# Patient Record
Sex: FEMALE | Race: WHITE | NOT HISPANIC OR LATINO | Employment: FULL TIME | URBAN - METROPOLITAN AREA
[De-identification: names, ages, dates, MRNs, and addresses within clinical notes are randomized per-mention and may not be internally consistent; named-entity substitution may affect disease eponyms.]

---

## 2018-05-28 ENCOUNTER — HOSPITAL ENCOUNTER (EMERGENCY)
Facility: HOSPITAL | Age: 33
Discharge: HOME | End: 2018-05-28
Attending: EMERGENCY MEDICINE | Admitting: EMERGENCY MEDICINE

## 2018-05-28 VITALS
TEMPERATURE: 98.1 F | DIASTOLIC BLOOD PRESSURE: 94 MMHG | BODY MASS INDEX: 36.1 KG/M2 | WEIGHT: 230 LBS | SYSTOLIC BLOOD PRESSURE: 164 MMHG | HEART RATE: 87 BPM | HEIGHT: 67 IN | RESPIRATION RATE: 18 BRPM

## 2018-05-28 DIAGNOSIS — L51.9 ERYTHEMA MULTIFORME: Primary | ICD-10-CM

## 2018-05-28 PROCEDURE — 99282 EMERGENCY DEPT VISIT SF MDM: CPT

## 2018-05-28 RX ORDER — HYDROXYZINE HYDROCHLORIDE 25 MG/1
25 TABLET, FILM COATED ORAL EVERY 6 HOURS
Qty: 30 TABLET | Refills: 0 | Status: SHIPPED | OUTPATIENT
Start: 2018-05-28

## 2018-05-28 RX ORDER — ACYCLOVIR 200 MG/1
400 CAPSULE ORAL ONCE
Status: COMPLETED | OUTPATIENT
Start: 2018-05-28 | End: 2018-05-28

## 2018-05-28 RX ORDER — ACYCLOVIR 400 MG/1
400 TABLET ORAL 4 TIMES DAILY
Qty: 28 TABLET | Refills: 0 | Status: SHIPPED | OUTPATIENT
Start: 2018-05-28 | End: 2020-02-14

## 2018-05-28 RX ORDER — HYDROXYZINE HYDROCHLORIDE 25 MG/1
25 TABLET, FILM COATED ORAL ONCE
Status: COMPLETED | OUTPATIENT
Start: 2018-05-28 | End: 2018-05-28

## 2018-05-28 RX ORDER — PREDNISONE 20 MG/1
60 TABLET ORAL ONCE
Status: COMPLETED | OUTPATIENT
Start: 2018-05-28 | End: 2018-05-28

## 2018-05-28 RX ORDER — PREDNISONE 10 MG/1
60 TABLET ORAL DAILY
Qty: 30 TABLET | Refills: 0 | Status: SHIPPED | OUTPATIENT
Start: 2018-05-28 | End: 2018-06-02

## 2018-05-28 RX ORDER — MUPIROCIN CALCIUM 20 MG/G
CREAM TOPICAL 3 TIMES DAILY
Qty: 15 G | Refills: 0 | Status: SHIPPED | OUTPATIENT
Start: 2018-05-28

## 2018-05-28 RX ADMIN — ACYCLOVIR 400 MG: 200 CAPSULE ORAL at 13:41

## 2018-05-28 RX ADMIN — HYDROXYZINE HYDROCHLORIDE 25 MG: 25 TABLET ORAL at 12:16

## 2018-05-28 RX ADMIN — PREDNISONE 60 MG: 20 TABLET ORAL at 12:17

## 2018-05-28 NOTE — ED PROVIDER NOTES
Final Diagnosis:  1  Erythema multiforme      Chief Complaint   Patient presents with    Rash     pt c/o rash that started on her right leg that now spread to her body  This is a 41-year-old female who presents for evaluation of a rash  The patient states that she remembers she was cleaning her yard and thinks she was maybe exposed to some poison ivy  She noticed that on the right posterior lateral leg, she developed a large lesion  It was very itchy  Since then she started knows these target urticarial type lesions occurring all over her body starting in the legs and heading proximally  It is very itchy  She tried 1 dose of Benadryl yesterday with no improvement  Calamine lotion was not helping  Because of the continued rash she has come in for evaluation  No fevers chills nausea vomiting chest pain shortness of breath  No tongue swelling throat swelling  No itchiness in her throat  No mucosal lesions  Denies dizziness or lightheadedness  She does have some increased rosacea since the onset of rash  Denies new medications  Denies new detergents  No history of similar  PMH:  - none  PSH:  - none  No smoking, drinking, drugs  PE:   Vitals:    05/28/18 1159   BP: 164/94   BP Location: Left arm   Pulse: 87   Resp: 18   Temp: 98 1 °F (36 7 °C)   TempSrc: Tympanic   Weight: 104 kg (230 lb)   Height: 5' 7" (1 702 m)   General: VSS, NAD, awake, alert  Well-nourished, well-developed  Appears stated age  Speaking normally in full sentences  Head: Normocephalic, atraumatic, nontender  Eyes: PERRL, EOM-I  No diplopia  No hyphema  No subconjunctival hemorrhages  Symmetrical lids  ENT: Atraumatic external nose and ears  MMM  No oral lesions  Has angle of mouth bilateral irritated, but looks like from dry skin  No malocclusion  No stridor  Normal phonation  No drooling  Normal swallowing  Neck: Symmetric, trachea midline  No JVD    CV: RRR  +S1/S2  No murmurs or gallops  Peripheral pulses +2 throughout  No chest wall tenderness  Lungs:   Unlabored No retractions  CTAB, lungs sounds equal bilateral    No tachypnea  Abd: +BS, soft, NT/ND    MSK:   FROM   Back:   No rashes  Skin: rash consistent with erythema multiforme present  Secondary excoriation noted but nothing suggesting bacterial cellulitis at the moment except right posterior leg  That sort of looks like impetigo so will trial treatment geared towards that  Neuro: AAOx3, GCS 15, CN II-XII grossly intact  Motor grossly intact  Psychiatric/Behavioral: Appropriate mood and affect   Exam: deferred  A:  - erythema multiforme  - secondary impetigo  P:  - mupirocin for the possible impetigo  - steroids + anti-histamine for the EM  - f/u PCP (patient states she won't)  - so come back here in 72 hours to make sure no massive progression to SJS/TEN  Does NOT HAVE any mucosal involvement at this time  - 13 point ROS was performed and all are normal unless stated in the history above  - Nursing note reviewed  Vitals reviewed  - Orders placed by myself and/or advanced practitioner / resident     - Previous chart was not reviewed  - No language barrier    - History obtained from patient  - There are no limitations to the history obtained  - Critical care time: Not applicable for this patient  ED Course as of May 28 1322   Mon May 28, 2018   1320 The patient has urticarial lesions looks slightly improved  I re-evaluated the rash on the right lateral leg  It does look like there might be a small herpetic component to it so I will add acyclovir  Patient understands the importance of follow-up  Medications   acyclovir (ZOVIRAX) capsule 400 mg (not administered)   hydrOXYzine HCL (ATARAX) tablet 25 mg (25 mg Oral Given 5/28/18 1216)   predniSONE tablet 60 mg (60 mg Oral Given 5/28/18 1217)     No orders to display     No orders of the defined types were placed in this encounter      Labs Reviewed - No data to display  Time reflects when diagnosis was documented in both MDM as applicable and the Disposition within this note     Time User Action Codes Description Comment    5/28/2018 12:13 PM Chevy Mendez Add [L51 9] Erythema multiforme       ED Disposition     ED Disposition Condition Comment    Discharge  Patel Galvan discharge to home/self care  Condition at discharge: Good        Follow-up Information     Follow up With Specialties Details Why Contact Info Additional [address] Emergency Department Emergency Medicine Go to If symptoms worsen [address]  [phone]  ED, [address] [facility], [facility] [place], [address] [facility], [address]        Patient's Medications   Discharge Prescriptions    ACYCLOVIR (ZOVIRAX) 400 MG TABLET    Take 1 tablet (400 mg total) by mouth 4 (four) times a day for 7 days       Start Date: 5/28/2018 End Date: 6/4/2018       Order Dose: 400 mg       Quantity: 28 tablet    Refills: 0    HYDROXYZINE HCL (ATARAX) 25 MG TABLET    Take 1 tablet (25 mg total) by mouth every 6 (six) hours       Start Date: 5/28/2018 End Date: --       Order Dose: 25 mg       Quantity: 30 tablet    Refills: 0    MUPIROCIN (BACTROBAN) 2 % CREAM    Apply topically 3 (three) times a day       Start Date: 5/28/2018 End Date: --       Order Dose: --       Quantity: 15 g    Refills: 0    PREDNISONE 10 MG TABLET    Take 6 tablets (60 mg total) by mouth daily for 5 days       Start Date: 5/28/2018 End Date: 6/2/2018       Order Dose: 60 mg       Quantity: 30 tablet    Refills: 0     No discharge procedures on file  None       Portions of the record may have been created with voice recognition software  Occasional wrong word or "sound a like" substitutions may have occurred due to the inherent limitations of voice recognition software  Read the chart carefully and recognize, using context, where substitutions have occurred      Electronically signed by:  Cassia Hogan Gladis Conway MD  05/28/18 7847

## 2018-05-28 NOTE — DISCHARGE INSTRUCTIONS
What is erythema multiforme? -- Erythema multiforme is a condition that causes red spots on the skin  The spots often have a dark center surrounded by pale red rings, like a target or bull's-eye (picture 1)  Sometimes, the spots have blisters  The spots can appear in different places on the body, including the:  ?Arms and legs  ? Chest and back  ? Face and neck  ? Palms of the hands  ? Soles of the feet  ? Lips, tongue, and gums  ? Near the eyes  ? Genital area  The spots might itch or burn  Some people have a fever and feel tired and achy before the spots appear  The spots usually show up over 3 to 5 days  They disappear in about 2 weeks  What causes erythema multiforme? -- Most of the time, erythema multiforme is caused by an infection  But medicines can cause erythema multiforme, too  Are there tests I should have? -- Your nurse or doctor should be able to tell if you have erythema multiforme by looking at your skin and doing an exam  He or she might also take a small skin sample and a blood sample  These samples will help your doctor make sure your symptoms aren't being caused by another medical condition  Is there anything I can do on my own to feel better? -- Yes  You can try putting a cool, damp cloth on the area with the spots  You can also take over-the-counter medicines such as:  ?Diphenhydramine (sample brand name: Benadryl) for itching and swelling  ? Acetaminophen (sample brand name: Tylenol) for fever and discomfort  How is erythema multiforme treated? -- Treatments include medicines to ease itching and pain  Some medicines are creams that you rub on your skin  Others are pills  If you have spots in your mouth, your doctor might give you a special mouthwash to help relieve the pain  Can erythema multiforme be prevented? -- If your condition was caused by a medicine, do not take that medicine again  Talk to your doctor or nurse about switching to a different medicine    Some people who are infected with a certain virus keep getting erythema multiforme over and over again  If you get erythema multiforme more than several times a year, talk with your doctor or nurse   He or she might give you a medicine to take every day that will help keep you from getting it so often

## 2018-11-07 ENCOUNTER — OFFICE VISIT (OUTPATIENT)
Dept: URGENT CARE | Age: 33
End: 2018-11-07
Payer: COMMERCIAL

## 2018-11-07 VITALS
SYSTOLIC BLOOD PRESSURE: 137 MMHG | HEIGHT: 67 IN | OXYGEN SATURATION: 97 % | HEART RATE: 77 BPM | RESPIRATION RATE: 18 BRPM | WEIGHT: 232 LBS | TEMPERATURE: 97.8 F | BODY MASS INDEX: 36.41 KG/M2 | DIASTOLIC BLOOD PRESSURE: 82 MMHG

## 2018-11-07 DIAGNOSIS — L03.032 PARONYCHIA OF GREAT TOE OF LEFT FOOT: Primary | ICD-10-CM

## 2018-11-07 PROCEDURE — S9088 SERVICES PROVIDED IN URGENT: HCPCS | Performed by: FAMILY MEDICINE

## 2018-11-07 PROCEDURE — 99213 OFFICE O/P EST LOW 20 MIN: CPT | Performed by: FAMILY MEDICINE

## 2018-11-07 RX ORDER — BUSPIRONE HYDROCHLORIDE 5 MG/1
15 TABLET ORAL
COMMUNITY
Start: 2016-09-09

## 2018-11-07 RX ORDER — CEFADROXIL 500 MG/1
500 CAPSULE ORAL EVERY 12 HOURS SCHEDULED
Qty: 14 CAPSULE | Refills: 0 | Status: SHIPPED | OUTPATIENT
Start: 2018-11-07 | End: 2018-11-14

## 2018-11-07 RX ORDER — ESCITALOPRAM OXALATE 20 MG/1
5 TABLET ORAL DAILY
COMMUNITY

## 2018-11-07 NOTE — PATIENT INSTRUCTIONS
Warm soaks 3 times per day  Keep site covered  Start antibiotic  Take probiotic  Follow up with podiatrist if no improvement  Go to ER with worsening symptoms

## 2018-11-07 NOTE — PROGRESS NOTES
[facility]        NAME: Lillie Yuan is a 27 y o  female  : 1985    MRN: [id number]  DATE: 2018  TIME: 1:23 PM    Assessment and Plan   Paronychia of great toe of left foot [L03 032]  1  Paronychia of great toe of left foot  cefadroxil (DURICEF) 500 mg capsule         Patient Instructions     Patient Instructions   Warm soaks 3 times per day  Keep site covered  Start antibiotic  Take probiotic  Follow up with podiatrist if no improvement  Go to ER with worsening symptoms  Chief Complaint     Chief Complaint   Patient presents with    Nail Problem      infected toe nail left big toe         History of Present Illness   Lillie Yuan presents to the clinic c/o    This is a 27year old female here today with complaints of left toe nail infection  She states she has had this for about 1 month symptoms started after she cut her toes  She states there has been pain, redness and swelling at lateral aspect of toe  She has been using hydrogen peroxide  No fevers  Review of Systems   Review of Systems   Constitutional: Negative  HENT: Negative  Respiratory: Negative  Cardiovascular: Negative  Skin: Positive for wound  Psychiatric/Behavioral: Negative            Current Medications     Long-Term Prescriptions   Medication Sig Dispense Refill    busPIRone (BUSPAR) 5 mg tablet Take 15 mg by mouth      escitalopram (LEXAPRO) 20 mg tablet Take 5 mg by mouth daily      acyclovir (ZOVIRAX) 400 MG tablet Take 1 tablet (400 mg total) by mouth 4 (four) times a day for 7 days 28 tablet 0    hydrOXYzine HCL (ATARAX) 25 mg tablet Take 1 tablet (25 mg total) by mouth every 6 (six) hours (Patient not taking: Reported on 2018 ) 30 tablet 0    mupirocin (BACTROBAN) 2 % cream Apply topically 3 (three) times a day (Patient not taking: Reported on 2018 ) 15 g 0       Current Allergies     Allergies as of 2018 - Reviewed 2018   Allergen Reaction Noted  Pseudoephedrine  01/12/2018            The following portions of the patient's history were reviewed and updated as appropriate: allergies, current medications, past family history, past medical history, past social history, past surgical history and problem list     Objective   /82   Pulse 77   Temp 97 8 °F (36 6 °C)   Resp 18   Ht 5' 7" (1 702 m)   Wt 105 kg (232 lb)   SpO2 97%   BMI 36 34 kg/m²        Physical Exam     Physical Exam   Constitutional: She is oriented to person, place, and time  She appears well-developed and well-nourished  Neck: Normal range of motion  Neck supple  Cardiovascular: Normal rate, regular rhythm and normal heart sounds  Pulmonary/Chest: Effort normal and breath sounds normal    Neurological: She is alert and oriented to person, place, and time  Skin: Skin is warm and dry  Left great toe: erythema and redness along the lateral aspect of toe  Small scabbed area  Erythema along the nailbed  Psychiatric: She has a normal mood and affect  Her behavior is normal    Nursing note and vitals reviewed

## 2020-02-14 ENCOUNTER — HOSPITAL ENCOUNTER (OUTPATIENT)
Facility: HOSPITAL | Age: 35
Setting detail: OBSERVATION
Discharge: HOME | End: 2020-02-16
Attending: EMERGENCY MEDICINE | Admitting: INTERNAL MEDICINE
Payer: COMMERCIAL

## 2020-02-14 ENCOUNTER — APPOINTMENT (EMERGENCY)
Dept: CT IMAGING | Facility: HOSPITAL | Age: 35
End: 2020-02-14
Payer: COMMERCIAL

## 2020-02-14 DIAGNOSIS — K82.8 SLUDGE IN GALLBLADDER: ICD-10-CM

## 2020-02-14 DIAGNOSIS — K29.70 GASTRITIS: ICD-10-CM

## 2020-02-14 DIAGNOSIS — R10.10 PAIN OF UPPER ABDOMEN: Primary | ICD-10-CM

## 2020-02-14 LAB
ALBUMIN SERPL BCP-MCNC: 4.1 G/DL (ref 3.5–5)
ALP SERPL-CCNC: 117 U/L (ref 46–116)
ALT SERPL W P-5'-P-CCNC: 46 U/L (ref 12–78)
ANION GAP SERPL CALCULATED.3IONS-SCNC: 10 MMOL/L (ref 4–13)
APTT PPP: 34 SECONDS (ref 23–37)
AST SERPL W P-5'-P-CCNC: 23 U/L (ref 5–45)
BACTERIA UR QL AUTO: ABNORMAL /HPF
BASOPHILS # BLD AUTO: 0.04 THOUSANDS/ΜL (ref 0–0.1)
BASOPHILS NFR BLD AUTO: 0 % (ref 0–1)
BILIRUB SERPL-MCNC: 1.09 MG/DL (ref 0.2–1)
BILIRUB UR QL STRIP: ABNORMAL
BUN SERPL-MCNC: 9 MG/DL (ref 5–25)
CALCIUM SERPL-MCNC: 9.2 MG/DL (ref 8.3–10.1)
CHLORIDE SERPL-SCNC: 102 MMOL/L (ref 100–108)
CLARITY UR: ABNORMAL
CO2 SERPL-SCNC: 28 MMOL/L (ref 21–32)
COLOR UR: ABNORMAL
CREAT SERPL-MCNC: 0.7 MG/DL (ref 0.6–1.3)
EOSINOPHIL # BLD AUTO: 0.21 THOUSAND/ΜL (ref 0–0.61)
EOSINOPHIL NFR BLD AUTO: 2 % (ref 0–6)
ERYTHROCYTE [DISTWIDTH] IN BLOOD BY AUTOMATED COUNT: 12.8 % (ref 11.6–15.1)
EXT PREG TEST URINE: NEGATIVE
EXT. CONTROL ED NAV: NORMAL
GFR SERPL CREATININE-BSD FRML MDRD: 112 ML/MIN/1.73SQ M
GLUCOSE SERPL-MCNC: 88 MG/DL (ref 65–140)
GLUCOSE UR STRIP-MCNC: NEGATIVE MG/DL
HCG SERPL QL: NEGATIVE
HCT VFR BLD AUTO: 40.3 % (ref 34.8–46.1)
HGB BLD-MCNC: 13.8 G/DL (ref 11.5–15.4)
HGB UR QL STRIP.AUTO: ABNORMAL
IMM GRANULOCYTES # BLD AUTO: 0.04 THOUSAND/UL (ref 0–0.2)
IMM GRANULOCYTES NFR BLD AUTO: 0 % (ref 0–2)
INR PPP: 1 (ref 0.84–1.19)
KETONES UR STRIP-MCNC: NEGATIVE MG/DL
LACTATE SERPL-SCNC: 1 MMOL/L (ref 0.5–2)
LEUKOCYTE ESTERASE UR QL STRIP: ABNORMAL
LIPASE SERPL-CCNC: 127 U/L (ref 73–393)
LYMPHOCYTES # BLD AUTO: 2.77 THOUSANDS/ΜL (ref 0.6–4.47)
LYMPHOCYTES NFR BLD AUTO: 22 % (ref 14–44)
MCH RBC QN AUTO: 31.1 PG (ref 26.8–34.3)
MCHC RBC AUTO-ENTMCNC: 34.2 G/DL (ref 31.4–37.4)
MCV RBC AUTO: 91 FL (ref 82–98)
MONOCYTES # BLD AUTO: 0.92 THOUSAND/ΜL (ref 0.17–1.22)
MONOCYTES NFR BLD AUTO: 7 % (ref 4–12)
MUCOUS THREADS UR QL AUTO: ABNORMAL
NEUTROPHILS # BLD AUTO: 8.58 THOUSANDS/ΜL (ref 1.85–7.62)
NEUTS SEG NFR BLD AUTO: 69 % (ref 43–75)
NITRITE UR QL STRIP: NEGATIVE
NON-SQ EPI CELLS URNS QL MICRO: ABNORMAL /HPF
NRBC BLD AUTO-RTO: 0 /100 WBCS
PH UR STRIP.AUTO: 5.5 [PH] (ref 4.5–8)
PLATELET # BLD AUTO: 242 THOUSANDS/UL (ref 149–390)
PMV BLD AUTO: 9.9 FL (ref 8.9–12.7)
POTASSIUM SERPL-SCNC: 3.4 MMOL/L (ref 3.5–5.3)
PROT SERPL-MCNC: 8.6 G/DL (ref 6.4–8.2)
PROT UR STRIP-MCNC: ABNORMAL MG/DL
PROTHROMBIN TIME: 12.6 SECONDS (ref 11.6–14.5)
RBC # BLD AUTO: 4.44 MILLION/UL (ref 3.81–5.12)
RBC #/AREA URNS AUTO: ABNORMAL /HPF
SODIUM SERPL-SCNC: 140 MMOL/L (ref 136–145)
SP GR UR STRIP.AUTO: >=1.03 (ref 1–1.03)
UROBILINOGEN UR QL STRIP.AUTO: 1 E.U./DL
WBC # BLD AUTO: 12.56 THOUSAND/UL (ref 4.31–10.16)
WBC #/AREA URNS AUTO: ABNORMAL /HPF

## 2020-02-14 PROCEDURE — 85610 PROTHROMBIN TIME: CPT | Performed by: EMERGENCY MEDICINE

## 2020-02-14 PROCEDURE — 85730 THROMBOPLASTIN TIME PARTIAL: CPT | Performed by: EMERGENCY MEDICINE

## 2020-02-14 PROCEDURE — 96374 THER/PROPH/DIAG INJ IV PUSH: CPT

## 2020-02-14 PROCEDURE — 85025 COMPLETE CBC W/AUTO DIFF WBC: CPT | Performed by: EMERGENCY MEDICINE

## 2020-02-14 PROCEDURE — 36415 COLL VENOUS BLD VENIPUNCTURE: CPT | Performed by: EMERGENCY MEDICINE

## 2020-02-14 PROCEDURE — 87147 CULTURE TYPE IMMUNOLOGIC: CPT

## 2020-02-14 PROCEDURE — 87040 BLOOD CULTURE FOR BACTERIA: CPT | Performed by: EMERGENCY MEDICINE

## 2020-02-14 PROCEDURE — 74177 CT ABD & PELVIS W/CONTRAST: CPT

## 2020-02-14 PROCEDURE — 83605 ASSAY OF LACTIC ACID: CPT | Performed by: EMERGENCY MEDICINE

## 2020-02-14 PROCEDURE — 81025 URINE PREGNANCY TEST: CPT | Performed by: EMERGENCY MEDICINE

## 2020-02-14 PROCEDURE — 87086 URINE CULTURE/COLONY COUNT: CPT

## 2020-02-14 PROCEDURE — 80053 COMPREHEN METABOLIC PANEL: CPT | Performed by: EMERGENCY MEDICINE

## 2020-02-14 PROCEDURE — 99285 EMERGENCY DEPT VISIT HI MDM: CPT | Performed by: EMERGENCY MEDICINE

## 2020-02-14 PROCEDURE — 99285 EMERGENCY DEPT VISIT HI MDM: CPT

## 2020-02-14 PROCEDURE — 96375 TX/PRO/DX INJ NEW DRUG ADDON: CPT

## 2020-02-14 PROCEDURE — 81001 URINALYSIS AUTO W/SCOPE: CPT

## 2020-02-14 PROCEDURE — 83690 ASSAY OF LIPASE: CPT | Performed by: EMERGENCY MEDICINE

## 2020-02-14 PROCEDURE — 84703 CHORIONIC GONADOTROPIN ASSAY: CPT | Performed by: EMERGENCY MEDICINE

## 2020-02-14 PROCEDURE — 96361 HYDRATE IV INFUSION ADD-ON: CPT

## 2020-02-14 RX ORDER — HYDROMORPHONE HCL/PF 1 MG/ML
0.5 SYRINGE (ML) INJECTION ONCE
Status: COMPLETED | OUTPATIENT
Start: 2020-02-14 | End: 2020-02-14

## 2020-02-14 RX ORDER — ONDANSETRON 2 MG/ML
4 INJECTION INTRAMUSCULAR; INTRAVENOUS ONCE
Status: COMPLETED | OUTPATIENT
Start: 2020-02-14 | End: 2020-02-14

## 2020-02-14 RX ORDER — SODIUM CHLORIDE 9 MG/ML
125 INJECTION, SOLUTION INTRAVENOUS CONTINUOUS
Status: DISCONTINUED | OUTPATIENT
Start: 2020-02-14 | End: 2020-02-16 | Stop reason: HOSPADM

## 2020-02-14 RX ADMIN — ONDANSETRON 4 MG: 2 INJECTION INTRAMUSCULAR; INTRAVENOUS at 23:06

## 2020-02-14 RX ADMIN — SODIUM CHLORIDE 1000 ML: 0.9 INJECTION, SOLUTION INTRAVENOUS at 23:06

## 2020-02-14 RX ADMIN — HYDROMORPHONE HYDROCHLORIDE 0.5 MG: 1 INJECTION, SOLUTION INTRAMUSCULAR; INTRAVENOUS; SUBCUTANEOUS at 23:07

## 2020-02-15 ENCOUNTER — APPOINTMENT (OUTPATIENT)
Dept: ULTRASOUND IMAGING | Facility: HOSPITAL | Age: 35
End: 2020-02-15
Payer: COMMERCIAL

## 2020-02-15 PROBLEM — F32.A DEPRESSION: Chronic | Status: ACTIVE | Noted: 2020-02-15

## 2020-02-15 PROBLEM — R82.71 BACTERIURIA: Status: ACTIVE | Noted: 2020-02-15

## 2020-02-15 PROBLEM — K29.00 ACUTE GASTRITIS WITHOUT HEMORRHAGE: Status: ACTIVE | Noted: 2020-02-15

## 2020-02-15 PROBLEM — E66.9 CLASS 2 OBESITY WITHOUT SERIOUS COMORBIDITY WITH BODY MASS INDEX (BMI) OF 38.0 TO 38.9 IN ADULT: Chronic | Status: ACTIVE | Noted: 2020-02-15

## 2020-02-15 PROBLEM — K82.8 GALLBLADDER SLUDGE: Status: ACTIVE | Noted: 2020-02-15

## 2020-02-15 LAB
ALBUMIN SERPL BCP-MCNC: 3.5 G/DL (ref 3.5–5)
ALP SERPL-CCNC: 106 U/L (ref 46–116)
ALT SERPL W P-5'-P-CCNC: 35 U/L (ref 12–78)
ANION GAP SERPL CALCULATED.3IONS-SCNC: 10 MMOL/L (ref 4–13)
AST SERPL W P-5'-P-CCNC: 20 U/L (ref 5–45)
BASOPHILS # BLD AUTO: 0.04 THOUSANDS/ΜL (ref 0–0.1)
BASOPHILS NFR BLD AUTO: 0 % (ref 0–1)
BILIRUB SERPL-MCNC: 1.06 MG/DL (ref 0.2–1)
BUN SERPL-MCNC: 8 MG/DL (ref 5–25)
CALCIUM SERPL-MCNC: 8.7 MG/DL (ref 8.3–10.1)
CHLORIDE SERPL-SCNC: 104 MMOL/L (ref 100–108)
CO2 SERPL-SCNC: 24 MMOL/L (ref 21–32)
CREAT SERPL-MCNC: 0.6 MG/DL (ref 0.6–1.3)
EOSINOPHIL # BLD AUTO: 0.18 THOUSAND/ΜL (ref 0–0.61)
EOSINOPHIL NFR BLD AUTO: 2 % (ref 0–6)
ERYTHROCYTE [DISTWIDTH] IN BLOOD BY AUTOMATED COUNT: 12.8 % (ref 11.6–15.1)
GFR SERPL CREATININE-BSD FRML MDRD: 118 ML/MIN/1.73SQ M
GLUCOSE SERPL-MCNC: 93 MG/DL (ref 65–140)
HCT VFR BLD AUTO: 37.1 % (ref 34.8–46.1)
HGB BLD-MCNC: 12.3 G/DL (ref 11.5–15.4)
IMM GRANULOCYTES # BLD AUTO: 0.02 THOUSAND/UL (ref 0–0.2)
IMM GRANULOCYTES NFR BLD AUTO: 0 % (ref 0–2)
LYMPHOCYTES # BLD AUTO: 1.87 THOUSANDS/ΜL (ref 0.6–4.47)
LYMPHOCYTES NFR BLD AUTO: 19 % (ref 14–44)
MCH RBC QN AUTO: 30.6 PG (ref 26.8–34.3)
MCHC RBC AUTO-ENTMCNC: 33.2 G/DL (ref 31.4–37.4)
MCV RBC AUTO: 92 FL (ref 82–98)
MONOCYTES # BLD AUTO: 0.76 THOUSAND/ΜL (ref 0.17–1.22)
MONOCYTES NFR BLD AUTO: 8 % (ref 4–12)
NEUTROPHILS # BLD AUTO: 6.78 THOUSANDS/ΜL (ref 1.85–7.62)
NEUTS SEG NFR BLD AUTO: 71 % (ref 43–75)
NRBC BLD AUTO-RTO: 0 /100 WBCS
PLATELET # BLD AUTO: 192 THOUSANDS/UL (ref 149–390)
PMV BLD AUTO: 9.9 FL (ref 8.9–12.7)
POTASSIUM SERPL-SCNC: 3.6 MMOL/L (ref 3.5–5.3)
PROT SERPL-MCNC: 7.5 G/DL (ref 6.4–8.2)
RBC # BLD AUTO: 4.02 MILLION/UL (ref 3.81–5.12)
SODIUM SERPL-SCNC: 138 MMOL/L (ref 136–145)
WBC # BLD AUTO: 9.65 THOUSAND/UL (ref 4.31–10.16)

## 2020-02-15 PROCEDURE — 96375 TX/PRO/DX INJ NEW DRUG ADDON: CPT

## 2020-02-15 PROCEDURE — 99220 PR INITIAL OBSERVATION CARE/DAY 70 MINUTES: CPT | Performed by: INTERNAL MEDICINE

## 2020-02-15 PROCEDURE — C9113 INJ PANTOPRAZOLE SODIUM, VIA: HCPCS | Performed by: EMERGENCY MEDICINE

## 2020-02-15 PROCEDURE — 80053 COMPREHEN METABOLIC PANEL: CPT | Performed by: PHYSICIAN ASSISTANT

## 2020-02-15 PROCEDURE — 85025 COMPLETE CBC W/AUTO DIFF WBC: CPT | Performed by: PHYSICIAN ASSISTANT

## 2020-02-15 PROCEDURE — 99204 OFFICE O/P NEW MOD 45 MIN: CPT | Performed by: INTERNAL MEDICINE

## 2020-02-15 PROCEDURE — 76705 ECHO EXAM OF ABDOMEN: CPT

## 2020-02-15 PROCEDURE — 96376 TX/PRO/DX INJ SAME DRUG ADON: CPT

## 2020-02-15 PROCEDURE — C9113 INJ PANTOPRAZOLE SODIUM, VIA: HCPCS | Performed by: PHYSICIAN ASSISTANT

## 2020-02-15 PROCEDURE — 96361 HYDRATE IV INFUSION ADD-ON: CPT

## 2020-02-15 RX ORDER — METOCLOPRAMIDE HYDROCHLORIDE 5 MG/ML
10 INJECTION INTRAMUSCULAR; INTRAVENOUS EVERY 6 HOURS PRN
Status: DISCONTINUED | OUTPATIENT
Start: 2020-02-15 | End: 2020-02-16 | Stop reason: HOSPADM

## 2020-02-15 RX ORDER — PANTOPRAZOLE SODIUM 40 MG/1
40 INJECTION, POWDER, FOR SOLUTION INTRAVENOUS ONCE
Status: COMPLETED | OUTPATIENT
Start: 2020-02-15 | End: 2020-02-15

## 2020-02-15 RX ORDER — MAGNESIUM HYDROXIDE/ALUMINUM HYDROXICE/SIMETHICONE 120; 1200; 1200 MG/30ML; MG/30ML; MG/30ML
30 SUSPENSION ORAL EVERY 4 HOURS PRN
Status: DISCONTINUED | OUTPATIENT
Start: 2020-02-15 | End: 2020-02-16 | Stop reason: HOSPADM

## 2020-02-15 RX ORDER — HYDROMORPHONE HCL/PF 1 MG/ML
0.5 SYRINGE (ML) INJECTION ONCE
Status: COMPLETED | OUTPATIENT
Start: 2020-02-15 | End: 2020-02-15

## 2020-02-15 RX ORDER — SUCRALFATE ORAL 1 G/10ML
1000 SUSPENSION ORAL
Status: DISCONTINUED | OUTPATIENT
Start: 2020-02-15 | End: 2020-02-16 | Stop reason: HOSPADM

## 2020-02-15 RX ORDER — ACETAMINOPHEN 325 MG/1
650 TABLET ORAL EVERY 6 HOURS PRN
Status: DISCONTINUED | OUTPATIENT
Start: 2020-02-15 | End: 2020-02-16 | Stop reason: HOSPADM

## 2020-02-15 RX ORDER — PANTOPRAZOLE SODIUM 40 MG/1
40 INJECTION, POWDER, FOR SOLUTION INTRAVENOUS EVERY 12 HOURS SCHEDULED
Status: DISCONTINUED | OUTPATIENT
Start: 2020-02-15 | End: 2020-02-16 | Stop reason: HOSPADM

## 2020-02-15 RX ORDER — ONDANSETRON 2 MG/ML
4 INJECTION INTRAMUSCULAR; INTRAVENOUS EVERY 6 HOURS PRN
Status: DISCONTINUED | OUTPATIENT
Start: 2020-02-15 | End: 2020-02-16 | Stop reason: HOSPADM

## 2020-02-15 RX ADMIN — ALUMINUM HYDROXIDE, MAGNESIUM HYDROXIDE, AND SIMETHICONE 30 ML: 200; 200; 20 SUSPENSION ORAL at 11:35

## 2020-02-15 RX ADMIN — ONDANSETRON 4 MG: 2 INJECTION INTRAMUSCULAR; INTRAVENOUS at 09:01

## 2020-02-15 RX ADMIN — SUCRALFATE 1000 MG: 1 SUSPENSION ORAL at 06:08

## 2020-02-15 RX ADMIN — ONDANSETRON 4 MG: 2 INJECTION INTRAMUSCULAR; INTRAVENOUS at 14:40

## 2020-02-15 RX ADMIN — SUCRALFATE 1000 MG: 1 SUSPENSION ORAL at 11:34

## 2020-02-15 RX ADMIN — ACETAMINOPHEN 650 MG: 325 TABLET, FILM COATED ORAL at 09:01

## 2020-02-15 RX ADMIN — METOCLOPRAMIDE 10 MG: 5 INJECTION, SOLUTION INTRAMUSCULAR; INTRAVENOUS at 17:02

## 2020-02-15 RX ADMIN — SUCRALFATE 1000 MG: 1 SUSPENSION ORAL at 21:27

## 2020-02-15 RX ADMIN — ACETAMINOPHEN 650 MG: 325 TABLET, FILM COATED ORAL at 14:54

## 2020-02-15 RX ADMIN — IOHEXOL 100 ML: 350 INJECTION, SOLUTION INTRAVENOUS at 00:29

## 2020-02-15 RX ADMIN — SODIUM CHLORIDE 125 ML/HR: 0.9 INJECTION, SOLUTION INTRAVENOUS at 23:15

## 2020-02-15 RX ADMIN — PANTOPRAZOLE SODIUM 40 MG: 40 INJECTION, POWDER, FOR SOLUTION INTRAVENOUS at 01:37

## 2020-02-15 RX ADMIN — PANTOPRAZOLE SODIUM 40 MG: 40 INJECTION, POWDER, FOR SOLUTION INTRAVENOUS at 09:01

## 2020-02-15 RX ADMIN — SUCRALFATE 1000 MG: 1 SUSPENSION ORAL at 02:50

## 2020-02-15 RX ADMIN — HYDROMORPHONE HYDROCHLORIDE 0.5 MG: 1 INJECTION, SOLUTION INTRAMUSCULAR; INTRAVENOUS; SUBCUTANEOUS at 01:37

## 2020-02-15 RX ADMIN — PANTOPRAZOLE SODIUM 40 MG: 40 INJECTION, POWDER, FOR SOLUTION INTRAVENOUS at 21:27

## 2020-02-15 RX ADMIN — SODIUM CHLORIDE 125 ML/HR: 0.9 INJECTION, SOLUTION INTRAVENOUS at 00:30

## 2020-02-15 NOTE — PLAN OF CARE
Problem: PAIN - ADULT  Goal: Verbalizes/displays adequate comfort level or baseline comfort level  Description  Interventions:  - Encourage patient to monitor pain and request assistance  - Assess pain using appropriate pain scale  - Administer analgesics based on type and severity of pain and evaluate response  - Implement non-pharmacological measures as appropriate and evaluate response  - Consider cultural and social influences on pain and pain management  - Notify physician/advanced practitioner if interventions unsuccessful or patient reports new pain  Outcome: Progressing     Problem: DISCHARGE PLANNING  Goal: Discharge to home or other facility with appropriate resources  Description  INTERVENTIONS:  - Identify barriers to discharge w/patient and caregiver  - Arrange for needed discharge resources and transportation as appropriate  - Identify discharge learning needs (meds, wound care, etc )  - Arrange for interpretive services to assist at discharge as needed  - Refer to Case Management Department for coordinating discharge planning if the patient needs post-hospital services based on physician/advanced practitioner order or complex needs related to functional status, cognitive ability, or social support system  Outcome: Progressing     Problem: Knowledge Deficit  Goal: Patient/family/caregiver demonstrates understanding of disease process, treatment plan, medications, and discharge instructions  Description  Complete learning assessment and assess knowledge base    Interventions:  - Provide teaching at level of understanding  - Provide teaching via preferred learning methods  Outcome: Progressing     Problem: GASTROINTESTINAL - ADULT  Goal: Minimal or absence of nausea and/or vomiting  Description  INTERVENTIONS:  - Administer IV fluids if ordered to ensure adequate hydration  - Maintain NPO status until nausea and vomiting are resolved  - Nasogastric tube if ordered  - Administer ordered antiemetic medications as needed  - Provide nonpharmacologic comfort measures as appropriate  - Advance diet as tolerated, if ordered  - Consider nutrition services referral to assist patient with adequate nutrition and appropriate food choices  Outcome: Progressing  Goal: Maintains or returns to baseline bowel function  Description  INTERVENTIONS:  - Assess bowel function  - Encourage oral fluids to ensure adequate hydration  - Administer IV fluids if ordered to ensure adequate hydration  - Administer ordered medications as needed  - Encourage mobilization and activity  - Consider nutritional services referral to assist patient with adequate nutrition and appropriate food choices  Outcome: Progressing  Goal: Maintains adequate nutritional intake  Description  INTERVENTIONS:  - Monitor percentage of each meal consumed  - Identify factors contributing to decreased intake, treat as appropriate  - Assist with meals as needed  - Monitor I&O, weight, and lab values if indicated  - Obtain nutrition services referral as needed  Outcome: Progressing

## 2020-02-15 NOTE — H&P
H&P- Cristina Jensen 1985, 37 y o  female MRN: [id number]  Unit/Bed#: ED 29 Encounter: [id number]  Primary Care Provider: Salena Maxwell MD   Date and time admitted to hospital: 2/14/2020 10:10 PM    Acute gastritis without hemorrhage  Assessment & Plan  · Since was a has had worsening pain in her epigastric area, especially after meals  Potentially contaminated food with spaghetti  No fevers or chills, she has been nauseous and vomiting, however no hematemesis  No changes in her stools  · CT: "Suggestion of mild gastritis in the distal and terminal and possible mild inflammation in the pylorus  Gastric or duodenal ulcer not excluded "  · Laboratory studies essentially unrevealing, she does have a mild leukocytosis which is likely secondary to vomiting  · IV PPI b i d  Unk Arturo · Start Carafate 4 times daily  · Clear liquid diet, advancing as tolerated  · Pain control with Mylanta, Pepcid as needed  · GI consultation    Class 2 obesity without serious comorbidity with body mass index (BMI) of 38 0 to 38 9 in adult  Assessment & Plan  · Body mass index is 38 19 kg/m²  · Diet exercise modifications stressed  Gallbladder sludge  Assessment & Plan  · CT: "Suggestion of sludge in the gallbladder without evidence of acute cholecystitis "  · No right upper quadrant symptoms  · Discussed with general surgery, they will see the patient in consultation  · Obtaining right upper quadrant ultrasound  No real evidence of acute cholecystitis clinically  · Likely outpatient follow-up as needed  * Bacteriuria  Assessment & Plan  · Patient noted to have contaminated urine, however it is not a clean-catch and she is not symptomatic  · Await urine culture, monitoring off of antibiotics  VTE Prophylaxis: low risk VTE  / reason for no mechanical VTE prophylaxis low risk VTE   Code Status: FULL CODE  POLST: POLST form is not discussed and not completed at this time    Discussion with family: patient at bedside    Anticipated Length of Stay:  Patient will be admitted on an Observation basis with an anticipated length of stay of  < 2 midnights  Justification for Hospital Stay: gastritis, cannot rule out ulcer    Total Time for Visit, including Counseling / Coordination of Care: 1 hour  Greater than 50% of this total time spent on direct patient counseling and coordination of care  Chief Complaint:   epigastric pain, N/V    History of Present Illness:    Sabi Borjas is a 37 y o  female who presents with epigastric pain, nausea, vomiting which has been worsening since Wednesday  Patient reports that time she ate some leftover spaghetti, which did not sit right with her  She has been increasingly nauseous, as well as had episodes of vomiting with severe epigastric pain since then  Reporting the pain gets worse with meals  However the pain can come at any time  Denying any hematemesis  Denying any changes in her stool  No recent antibiotic use  Denies any history of the same  Denying any right upper quadrant symptoms, changes in her skin, fevers, chills  No urinary symptoms, hematuria, dysuria, as suprapubic abdominal pain  Denying any coughing, chest pain, shortness of breath, wheezing, lightheadedness, dizziness, headaches  No history of acid reflux  She has had some relief with IV Dilaudid emergency department  Review of Systems:    Review of Systems   Constitutional: Negative for activity change, appetite change, chills, fatigue and fever  HENT: Negative for congestion, rhinorrhea, sinus pressure and sore throat  Eyes: Negative for photophobia, pain and visual disturbance  Respiratory: Negative for cough, shortness of breath and wheezing  Cardiovascular: Negative for chest pain, palpitations and leg swelling  Gastrointestinal: Positive for abdominal pain (epigastric), nausea and vomiting  Negative for abdominal distention, constipation and diarrhea     Endocrine: Negative for cold intolerance, heat intolerance, polydipsia and polyuria  Genitourinary: Negative for difficulty urinating, dysuria, flank pain, frequency and hematuria  Musculoskeletal: Negative for arthralgias, back pain and joint swelling  Skin: Negative for color change, pallor and rash  Allergic/Immunologic: Negative  Neurological: Negative for dizziness, syncope, weakness, light-headedness and headaches  Hematological: Negative  Psychiatric/Behavioral: Negative  Past Medical and Surgical History:     Past Medical History:   Diagnosis Date    Anxiety     Depression     PTSD (post-traumatic stress disorder)        Past Surgical History:   Procedure Laterality Date    KNEE ARTHROSCOPY W/ ACL RECONSTRUCTION         Meds/Allergies:    Prior to Admission medications    Medication Sig Start Date End Date Taking? Authorizing Provider   busPIRone (BUSPAR) 5 mg tablet Take 15 mg by mouth 9/9/16   Historical Provider, MD   escitalopram (LEXAPRO) 20 mg tablet Take 5 mg by mouth daily    Historical Provider, MD   hydrOXYzine HCL (ATARAX) 25 mg tablet Take 1 tablet (25 mg total) by mouth every 6 (six) hours  Patient not taking: Reported on 11/7/2018 5/28/18   Estefany Lawson MD   mupirocin (BACTROBAN) 2 % cream Apply topically 3 (three) times a day  Patient not taking: Reported on 11/7/2018 5/28/18   Estefany Lawson MD     I have reviewed home medications with patient personally      Allergies: No Known Allergies    Social History:     Marital Status: /Civil Union   Occupation: non-contrib  Patient Pre-hospital Living Situation: home  Patient Pre-hospital Level of Mobility: full  Patient Pre-hospital Diet Restrictions: none  Substance Use History:   Social History     Substance and Sexual Activity   Alcohol Use No     Social History     Tobacco Use   Smoking Status Never Smoker   Smokeless Tobacco Never Used     Social History     Substance and Sexual Activity   Drug Use No       Family History:    Family History   Problem Relation Age of Onset    Hypertension Mother     COPD Father        Physical Exam:     Vitals:   Blood Pressure: 140/66 (02/15/20 0015)  Pulse: 86 (02/15/20 0015)  Temperature: 98 °F (36 7 °C) (02/14/20 2216)  Temp Source: Oral (02/14/20 2216)  Respirations: 16 (02/15/20 0015)  Height: 5' 7" (170 2 cm) (02/14/20 2216)  Weight - Scale: 111 kg (243 lb 13 3 oz) (02/14/20 2216)  SpO2: 100 % (02/15/20 0015)    Physical Exam   Constitutional: She is oriented to person, place, and time  She appears well-developed and well-nourished  No distress  HENT:   Head: Normocephalic and atraumatic  Mouth/Throat: Oropharynx is clear and moist    Eyes: Pupils are equal, round, and reactive to light  EOM are normal  No scleral icterus  Neck: Neck supple  Cardiovascular: Normal rate, regular rhythm and normal heart sounds  No murmur heard  Pulmonary/Chest: Effort normal and breath sounds normal  No respiratory distress  She has no wheezes  She has no rales  Abdominal: Soft  Bowel sounds are normal  She exhibits no distension  There is tenderness (epigastric)  There is no rebound, no guarding and negative Raines's sign  Musculoskeletal: She exhibits no deformity  Neurological: She is alert and oriented to person, place, and time  Skin: Skin is warm and dry  Capillary refill takes less than 2 seconds  No rash noted  She is not diaphoretic  No erythema  No pallor  Psychiatric: She has a normal mood and affect  Nursing note and vitals reviewed  Additional Data:     Lab Results: I have personally reviewed pertinent reports        Results from last 7 days   Lab Units 02/14/20  2228   WBC Thousand/uL 12 56*   HEMOGLOBIN g/dL 13 8   HEMATOCRIT % 40 3   PLATELETS Thousands/uL 242   NEUTROS PCT % 69   LYMPHS PCT % 22   MONOS PCT % 7   EOS PCT % 2     Results from last 7 days   Lab Units 02/14/20  2229   SODIUM mmol/L 140   POTASSIUM mmol/L 3 4*   CHLORIDE mmol/L 102   CO2 mmol/L 28 BUN mg/dL 9   CREATININE mg/dL 0 70   ANION GAP mmol/L 10   CALCIUM mg/dL 9 2   ALBUMIN g/dL 4 1   TOTAL BILIRUBIN mg/dL 1 09*   ALK PHOS U/L 117*   ALT U/L 46   AST U/L 23   GLUCOSE RANDOM mg/dL 88     Results from last 7 days   Lab Units 02/14/20  2229   INR  1 00             Results from last 7 days   Lab Units 02/14/20  2242   LACTIC ACID mmol/L 1 0       Imaging: I have personally reviewed pertinent reports  CT abdomen pelvis with contrast   ED Interpretation by Megan Lorenzo MD (72/14 4000)   FINDINGS:      ABDOMEN      LOWER CHEST:  Minimal subsegmental atelectasis at the lung bases  LIVER/BILIARY TREE:  Steatosis  GALLBLADDER:  Layering density in the gallbladder may represent sludge   No evidence of acute cholecystitis  SPLEEN:  Unremarkable  PANCREAS:  Unremarkable  ADRENAL GLANDS:  Unremarkable  KIDNEYS/URETERS:  No pyelonephritis or obstructive uropathy  STOMACH AND BOWEL:  Suggestion of mild inflammation in the distal gastric antrum and pylorus and adjacent fat stranding   Decompression of the stomach limits evaluation  No evidence of bowel obstruction   Diverticulosis without findings to suggest diverticulitis or colitis  APPENDIX:  Normal appendix  ABDOMINOPELVIC CAVITY:  No fluid collection or free intraperitoneal air  No lymphadenopathy  VESSELS:  Patent portal and splenic veins  PELVIS      REPRODUCTIVE ORGANS:  Intrauterine device in expected position  URINARY BLADDER:  Unremarkable  ABDOMINAL WALL/INGUINAL REGIONS:  Unremarkable  OSSEOUS STRUCTURES:  No acute fracture or destructive osseous lesion  Impression:        1   Suggestion of mild gastritis in the distal and terminal and possible mild inflammation in the pylorus   Gastric or duodenal ulcer not excluded  2   Suggestion of sludge in the gallbladder without evidence of acute cholecystitis           Workstation performed: [id number]         Final Result by Ingrid Chawla MD (02/15 0040)      1  Suggestion of mild gastritis in the distal and terminal and possible mild inflammation in the pylorus  Gastric or duodenal ulcer not excluded  2   Suggestion of sludge in the gallbladder without evidence of acute cholecystitis  Workstation performed: [id number]             EKG, Pathology, and Other Studies Reviewed on Admission:   · Prior pertinent studies and records reviewed in [facility] / Free & Clear Records Reviewed: Yes     ** Please Note: This note has been constructed using a voice recognition system   **

## 2020-02-15 NOTE — ED PROVIDER NOTES
History  Chief Complaint   Patient presents with    Abdominal Pain     Pt comes to ED c/o mid abdominal pain starting Wednesday  +nausea     Patient is a 37year old female with constant worsening upper abdominal pain with nausea and loose BM since this past Wednesday  No fever  No diarrhea  No urinary sx  No GI bleeding  No abdominal surgery  LMP - about 1 month ago and has an IUD  No travel  No ill contacts  Works as a teacher  No raw meat, eggs, fish or recent abx use  States she did not drive here  Was last seen at [facility] ED on 5/28/18 for erythema multiforme  [facility] [place] [facility] checked on this patient and last Rx filled was on 12/21/19 for adderall for 7 day supply  History provided by:  Patient and relative (sister)   used: No    Abdominal Pain   Associated symptoms: nausea    Associated symptoms: no diarrhea (but does have loose BMs), no fever and no vomiting        Prior to Admission Medications   Prescriptions Last Dose Informant Patient Reported?  Taking?   busPIRone (BUSPAR) 5 mg tablet Not Taking at Unknown time  Yes No   Sig: Take 15 mg by mouth   escitalopram (LEXAPRO) 20 mg tablet Not Taking at Unknown time  Yes No   Sig: Take 5 mg by mouth daily   hydrOXYzine HCL (ATARAX) 25 mg tablet Not Taking at Unknown time  No No   Sig: Take 1 tablet (25 mg total) by mouth every 6 (six) hours   Patient not taking: Reported on 11/7/2018    mupirocin (BACTROBAN) 2 % cream Not Taking at Unknown time  No No   Sig: Apply topically 3 (three) times a day   Patient not taking: Reported on 11/7/2018       Facility-Administered Medications: None       Past Medical History:   Diagnosis Date    Anxiety     Depression     PTSD (post-traumatic stress disorder)        Past Surgical History:   Procedure Laterality Date    KNEE ARTHROSCOPY W/ ACL RECONSTRUCTION         Family History   Problem Relation Age of Onset    Hypertension Mother     COPD Father      I have reviewed and agree with the history as documented  Social History     Tobacco Use    Smoking status: Never Smoker    Smokeless tobacco: Never Used   Substance Use Topics    Alcohol use: No    Drug use: No       Review of Systems   Constitutional: Negative for fever  Gastrointestinal: Positive for abdominal pain and nausea  Negative for blood in stool, diarrhea (but does have loose BMs) and vomiting  Genitourinary: Negative for difficulty urinating  All other systems reviewed and are negative  Physical Exam  Physical Exam   Constitutional: She is oriented to person, place, and time  She appears well-developed and well-nourished  She appears distressed (moderate)  HENT:   Head: Normocephalic and atraumatic  Mucous membranes somewhat moist     Eyes: No scleral icterus  Neck: No JVD present  No tracheal deviation present  Cardiovascular: Normal rate, regular rhythm and normal heart sounds  No murmur heard  Pulmonary/Chest: Effort normal and breath sounds normal  No respiratory distress  Abdominal: Soft  Bowel sounds are normal  She exhibits no distension  There is tenderness (diffuse upper)  There is no rebound and no guarding  Musculoskeletal: She exhibits no edema or deformity  Neurological: She is alert and oriented to person, place, and time  Skin: Skin is warm and dry  No rash noted  Psychiatric: She has a normal mood and affect  Nursing note and vitals reviewed        Vital Signs  ED Triage Vitals   Temperature Pulse Respirations Blood Pressure SpO2   02/14/20 2216 02/14/20 2216 02/14/20 2216 02/14/20 2216 02/14/20 2216   98 °F (36 7 °C) 93 18 (!) 173/75 100 %      Temp Source Heart Rate Source Patient Position - Orthostatic VS BP Location FiO2 (%)   02/14/20 2216 02/14/20 2214 02/14/20 2216 02/14/20 2216 --   Oral Monitor Sitting Left arm       Pain Score       02/14/20 2216       8           Vitals:    02/14/20 2216 02/15/20 0015   BP: (!) 173/75 140/66   Pulse: 93 86   Patient Position - Orthostatic VS: Sitting          Visual Acuity      ED Medications  Medications   sodium chloride 0 9 % infusion (125 mL/hr Intravenous New Bag 2/15/20 0030)   sodium chloride 0 9 % bolus 1,000 mL (0 mL Intravenous Stopped 2/15/20 0010)   ondansetron (ZOFRAN) injection 4 mg (4 mg Intravenous Given 2/14/20 2306)   HYDROmorphone (DILAUDID) injection 0 5 mg (0 5 mg Intravenous Given 2/14/20 2307)   iohexol (OMNIPAQUE) 350 MG/ML injection (MULTI-DOSE) 100 mL (100 mL Intravenous Given 2/15/20 0029)   HYDROmorphone (DILAUDID) injection 0 5 mg (0 5 mg Intravenous Given 2/15/20 0137)   pantoprazole (PROTONIX) injection 40 mg (40 mg Intravenous Given 2/15/20 0137)       Diagnostic Studies  Results Reviewed     Procedure Component Value Units Date/Time    Blood culture #1 [[id number]] Collected:  02/14/20 2242    Lab Status: In process Specimen:  Blood from Arm, Left Updated:  02/15/20 0103    Lactic acid, plasma x2 [[id number]]  (Normal) Collected:  02/14/20 2242    Lab Status:  Final result Specimen:  Blood from Arm, Left Updated:  02/14/20 2324     LACTIC ACID 1 0 mmol/L     Narrative:       Result may be elevated if tourniquet was used during collection  Urine Microscopic [[id number]]  (Abnormal) Collected:  02/14/20 2248    Lab Status:  Final result Specimen:  Urine, Clean Catch Updated:  02/14/20 2311     RBC, UA 0-1 /hpf      WBC, UA 10-20 /hpf      Epithelial Cells Moderate /hpf      Bacteria, UA Moderate /hpf      MUCUS THREADS Occasional    Urine culture [[id number]] Collected:  02/14/20 2248    Lab Status:   In process Specimen:  Urine, Clean Catch Updated:  02/14/20 2310    Lipase [[id number]]  (Normal) Collected:  02/14/20 2229    Lab Status:  Final result Specimen:  Blood from Arm, Right Updated:  02/14/20 2259     Lipase 127 u/L     hCG, qualitative pregnancy [[id number]]  (Normal) Collected:  02/14/20 2229    Lab Status:  Final result Specimen:  Blood from Arm, Right Updated:  02/14/20 2259     Preg, Serum Negative    Comprehensive metabolic panel [[id number]]  (Abnormal) Collected:  02/14/20 2229    Lab Status:  Final result Specimen:  Blood from Arm, Right Updated:  02/14/20 2253     Sodium 140 mmol/L      Potassium 3 4 mmol/L      Chloride 102 mmol/L      CO2 28 mmol/L      ANION GAP 10 mmol/L      BUN 9 mg/dL      Creatinine 0 70 mg/dL      Glucose 88 mg/dL      Calcium 9 2 mg/dL      AST 23 U/L      ALT 46 U/L      Alkaline Phosphatase 117 U/L      Total Protein 8 6 g/dL      Albumin 4 1 g/dL      Total Bilirubin 1 09 mg/dL      eGFR 112 ml/min/1 73sq m     Narrative:       [place] guidelines for Chronic Kidney Disease (CKD):     Stage 1 with normal or high GFR (GFR > 90 mL/min/1 73 square meters)    Stage 2 Mild CKD (GFR = 60-89 mL/min/1 73 square meters)    Stage 3A Moderate CKD (GFR = 45-59 mL/min/1 73 square meters)    Stage 3B Moderate CKD (GFR = 30-44 mL/min/1 73 square meters)    Stage 4 Severe CKD (GFR = 15-29 mL/min/1 73 square meters)    Stage 5 End Stage CKD (GFR <15 mL/min/1 73 square meters)  Note: GFR calculation is accurate only with a steady state creatinine    Blood culture #2 [[id number]] Collected:  02/14/20 2249    Lab Status:   In process Specimen:  Blood from Arm, Left Updated:  02/14/20 2252    Protime-INR [[id number]]  (Normal) Collected:  02/14/20 2229    Lab Status:  Final result Specimen:  Blood from Arm, Right Updated:  02/14/20 2247     Protime 12 6 seconds      INR 1 00    APTT [[id number]]  (Normal) Collected:  02/14/20 2229    Lab Status:  Final result Specimen:  Blood from Arm, Right Updated:  02/14/20 2247     PTT 34 seconds     POCT pregnancy, urine [[id number]]  (Normal) Resulted:  02/14/20 2246    Lab Status:  Final result Updated:  02/14/20 2246     EXT PREG TEST UR (Ref: Negative) Negative     Control Valid    Urine Macroscopic, POC [[id number]]  (Abnormal) Collected:  02/14/20 2248    Lab Status:  Final result Specimen:  Urine Updated:  02/14/20 2243     Color, UA Anne Clarity, UA Cloudy     pH, UA 5 5     Leukocytes, UA Small     Nitrite, UA Negative     Protein, UA Trace mg/dl      Glucose, UA Negative mg/dl      Ketones, UA Negative mg/dl      Urobilinogen, UA 1 0 E U /dl      Bilirubin, UA Interference- unable to analyze     Blood, UA Small     Specific [place], UA >=1 030    Narrative:       CLINITEK RESULT    CBC and differential [[id number]]  (Abnormal) Collected:  02/14/20 2228    Lab Status:  Final result Specimen:  Blood from Arm, Right Updated:  02/14/20 2238     WBC 12 56 Thousand/uL      RBC 4 44 Million/uL      Hemoglobin 13 8 g/dL      Hematocrit 40 3 %      MCV 91 fL      MCH 31 1 pg      MCHC 34 2 g/dL      RDW 12 8 %      MPV 9 9 fL      Platelets 943 Thousands/uL      nRBC 0 /100 WBCs      Neutrophils Relative 69 %      Immat GRANS % 0 %      Lymphocytes Relative 22 %      Monocytes Relative 7 %      Eosinophils Relative 2 %      Basophils Relative 0 %      Neutrophils Absolute 8 58 Thousands/µL      Immature Grans Absolute 0 04 Thousand/uL      Lymphocytes Absolute 2 77 Thousands/µL      Monocytes Absolute 0 92 Thousand/µL      Eosinophils Absolute 0 21 Thousand/µL      Basophils Absolute 0 04 Thousands/µL                  CT abdomen pelvis with contrast   ED Interpretation by Tatum Beltran MD (02/15 0042)   FINDINGS:      ABDOMEN      LOWER CHEST:  Minimal subsegmental atelectasis at the lung bases  LIVER/BILIARY TREE:  Steatosis  GALLBLADDER:  Layering density in the gallbladder may represent sludge   No evidence of acute cholecystitis  SPLEEN:  Unremarkable  PANCREAS:  Unremarkable  ADRENAL GLANDS:  Unremarkable  KIDNEYS/URETERS:  No pyelonephritis or obstructive uropathy  STOMACH AND BOWEL:  Suggestion of mild inflammation in the distal gastric antrum and pylorus and adjacent fat stranding   Decompression of the stomach limits evaluation        No evidence of bowel obstruction   Diverticulosis without findings to suggest diverticulitis or colitis  APPENDIX:  Normal appendix  ABDOMINOPELVIC CAVITY:  No fluid collection or free intraperitoneal air  No lymphadenopathy  VESSELS:  Patent portal and splenic veins  PELVIS      REPRODUCTIVE ORGANS:  Intrauterine device in expected position  URINARY BLADDER:  Unremarkable  ABDOMINAL WALL/INGUINAL REGIONS:  Unremarkable  OSSEOUS STRUCTURES:  No acute fracture or destructive osseous lesion  Impression:        1   Suggestion of mild gastritis in the distal and terminal and possible mild inflammation in the pylorus   Gastric or duodenal ulcer not excluded  2   Suggestion of sludge in the gallbladder without evidence of acute cholecystitis  Workstation performed: [id number]         Final Result by Pardeep Madrid MD (02/15 0040)      1  Suggestion of mild gastritis in the distal and terminal and possible mild inflammation in the pylorus  Gastric or duodenal ulcer not excluded  2   Suggestion of sludge in the gallbladder without evidence of acute cholecystitis  Workstation performed: [id number]                    Procedures  Procedures         ED Course  ED Course as of Feb 15 0141   Fri Feb 14, 2020   2311 Labs d/w patient and sister with patient's permission  Sat Feb 15, 2020   0116 CT d/w patient and sister  Patient  in upper abdomen including epigastric and RUQ regions so more IV dilaudid and IV protonix ordered and admission indicated  9942 D/w surgical resident who wants SLIM for admission and they will consult  Initial Sepsis Screening     Row Name 02/14/20 6918                Is the patient's history suggestive of a new or worsening infection? (!) Yes (Proceed)  -AO        Suspected source of infection  acute abdominal infection  -AO        Are two or more of the following signs & symptoms of infection both present and new to the patient?   (!) Yes (Proceed)  -AO Indicate SIRS criteria  Tachycardia > 90 bpm;Leukocytosis (WBC > 86821 IJL)  -AO        If the answer is yes to both questions, suspicion of sepsis is present          If severe sepsis is present AND tissue hypoperfusion perists in the hour after fluid resuscitation or lactate > 4, the patient meets criteria for SEPTIC SHOCK          Are any of the following organ dysfunction criteria present within 6 hours of suspected infection and SIRS criteria that are NOT considered to be chronic conditions? No  -AO        Organ dysfunction          Date of presentation of severe sepsis          Time of presentation of severe sepsis          Tissue hypoperfusion persists in the hour after crystalloid fluid administration, evidenced, by either:          Was hypotension present within one hour of the conclusion of crystalloid fluid administration?         Date of presentation of septic shock          Time of presentation of septic shock            User Key  (r) = Recorded By, (t) = Taken By, (c) = Cosigned By    [address] Name Provider Deann Lopez MD Physician                  MDM  Number of Diagnoses or Management Options  Diagnosis management comments: DDx including but not limited to: appendicitis, gastroenteritis, gastritis, PUD, GERD, gastroparesis, hepatitis, pancreatitis, colitis, enteritis, food poisoning, mesenteric adenitis, IBD, IBS, ileus, bowel obstruction, volvulus, cholecystitis, biliary colic, choledocholithiasis, perforated viscus, tumor, splenic etiology, diverticulitis, internal hernia, constipation, pelvic pathology, renal colic, pyelonephritis, UTI         Amount and/or Complexity of Data Reviewed  Clinical lab tests: ordered and reviewed  Tests in the radiology section of CPT®: ordered and reviewed  Decide to obtain previous medical records or to obtain history from someone other than the patient: yes  Obtain history from someone other than the patient: yes  Review and summarize past medical records: yes  Independent visualization of images, tracings, or specimens: yes          Disposition  Final diagnoses:   Pain of upper abdomen   Gastritis   Sludge in gallbladder     Time reflects when diagnosis was documented in both MDM as applicable and the Disposition within this note     Time User Action Codes Description Comment    2/15/2020  1:19 AM Juhi Tejeda [R10 10] Pain of upper abdomen     2/15/2020  1:19 AM Juhi Hubert Add [K29 70] Gastritis     2/15/2020  1:19 AM Juhi Hubert Tejeda [K82 8] Sludge in gallbladder       ED Disposition     ED Disposition Condition Date/Time Comment    Admit Stable Sat Feb 15, 2020  1:41 AM Case was discussed with JENNIFER Morton and the patient's admission status was agreed to be Admission Status: observation status to the service of Dr Ashley Motley   Follow-up Information    None         Patient's Medications   Discharge Prescriptions    No medications on file     No discharge procedures on file      PDMP Review       Value Time User    PDMP Reviewed  Yes 2/14/2020 10:15 PM Veronica Woodward MD          ED Provider  Electronically Signed by           Veronica Woodward MD  02/15/20 6458

## 2020-02-15 NOTE — ASSESSMENT & PLAN NOTE
· CT: "Suggestion of sludge in the gallbladder without evidence of acute cholecystitis "  · No right upper quadrant symptoms  · Discussed with general surgery, they will see the patient in consultation  · Obtaining right upper quadrant ultrasound  No real evidence of acute cholecystitis clinically  · Likely outpatient follow-up as needed

## 2020-02-15 NOTE — CONSULTS
Consultation - [facility]) [facility]  Bonnie Cheatham 37 y o  female MRN: [id number]  Unit/Bed#: S -01 Encounter: [id number]         Reason for Consult / Principal Problem:  Upper abdominal pain    HPI:  Joanna Clancy is a 15-year-old female who presented to the emergency room yesterday for abdominal pain that began on Wednesday  This has been associated with nausea and intermittent vomiting  Her abdominal pain increases with meals  She denies coffee-ground emesis or hematemesis  She denies chronic GI symptoms  She denies pyrosis or regurgitation  No dysphagia or odynophagia  She denies fevers or chills  She takes NSAIDs very rarely for headache  On admission, CT of the abdomen showing mild inflammation in the distal gastric antrum and pylorus  Also, there was note of layering density in the gallbladder likely representing sludge  White blood cell count and lipase within normal limits  LFTs relatively unremarkable  Patient was seen and examined at the bedside this morning  She is attempting to have clear liquids  She reports that she had some vomiting this morning  Right upper quadrant ultrasound is pending  She has never had an EGD or colonoscopy  Review of Systems:    CONSTITUTIONAL: Denies any fever, chills, or rigors  Good appetite, and no recent weight loss  HEENT: No earache or tinnitus  Denies hearing loss or visual disturbances  CARDIOVASCULAR: No chest pain or palpitations  RESPIRATORY: Denies any cough, hemoptysis, shortness of breath or dyspnea on exertion  GASTROINTESTINAL: As noted in the History of Present Illness  GENITOURINARY: No problems with urination  Denies any hematuria or dysuria  NEUROLOGIC: No dizziness or vertigo, denies headaches  MUSCULOSKELETAL: Denies any muscle or joint pain  SKIN: Denies skin rashes or itching  ENDOCRINE: Denies excessive thirst  Denies intolerance to heat or cold  PSYCHOSOCIAL: Denies depression or anxiety   Denies any recent memory loss  Historical Information   Past Medical History:   Diagnosis Date    Anxiety     Depression     PTSD (post-traumatic stress disorder)      Past Surgical History:   Procedure Laterality Date    KNEE ARTHROSCOPY W/ ACL RECONSTRUCTION       Social History   Social History     Substance and Sexual Activity   Alcohol Use No     Social History     Substance and Sexual Activity   Drug Use No     Social History     Tobacco Use   Smoking Status Never Smoker   Smokeless Tobacco Never Used     Family History   Problem Relation Age of Onset    Hypertension Mother     COPD Father         Meds/Allergies     Current Facility-Administered Medications   Medication Dose Route Frequency    acetaminophen (TYLENOL) tablet 650 mg  650 mg Oral Q6H PRN    aluminum-magnesium hydroxide-simethicone (MYLANTA) 200-200-20 mg/5 mL oral suspension 30 mL  30 mL Oral Q4H PRN    ondansetron (ZOFRAN) injection 4 mg  4 mg Intravenous Q6H PRN    pantoprazole (PROTONIX) injection 40 mg  40 mg Intravenous Q12H [address]    sodium chloride 0 9 % infusion  125 mL/hr Intravenous Continuous    sucralfate (CARAFATE) oral suspension 1,000 mg  1,000 mg Oral 4x Daily (AC & HS)       No Known Allergies      Objective     Blood pressure 142/77, pulse (!) 107, temperature 98 8 °F (37 1 °C), temperature source Oral, resp  rate 18, height 5' 7" (1 702 m), weight 111 kg (243 lb 13 3 oz), SpO2 95 %  Intake/Output Summary (Last 24 hours) at 2/15/2020 1044  Last data filed at 2/15/2020 0010  Gross per 24 hour   Intake 1000 ml   Output    Net 1000 ml         PHYSICAL EXAM:      General Appearance:   Alert and oriented x 3   Cooperative, and in no respiratory distress   HEENT:   Normocephalic, atraumatic, anicteric      Neck:  Supple, symmetrical, trachea midline   Lungs:   Clear to auscultation bilaterally; no rales, rhonchi or wheezing; respirations unlabored    Heart[de-identified]   S1 and S2 normal; regular rate and rhythm; no murmur, rub, or gallop  Abdomen:   Soft, epigastric tenderness without guarding or rigidity, non-distended; normal bowel sounds; no masses, no organomegaly    Genitalia:   Deferred    Rectal:   Deferred    Extremities:  No cyanosis, clubbing or edema    Pulses:  2+ and symmetric all extremities    Skin:  Skin color, texture, turgor normal, no rashes or lesions    Lymph nodes:  No palpable cervical or supraclavicular lymphadenopathy        Lab Results:   Results from last 7 days   Lab Units 02/15/20  0457   WBC Thousand/uL 9 65   HEMOGLOBIN g/dL 12 3   HEMATOCRIT % 37 1   PLATELETS Thousands/uL 192   NEUTROS PCT % 71   LYMPHS PCT % 19   MONOS PCT % 8   EOS PCT % 2     Results from last 7 days   Lab Units 02/15/20  0457   POTASSIUM mmol/L 3 6   CHLORIDE mmol/L 104   CO2 mmol/L 24   BUN mg/dL 8   CREATININE mg/dL 0 60   CALCIUM mg/dL 8 7   ALK PHOS U/L 106   ALT U/L 35   AST U/L 20     Results from last 7 days   Lab Units 02/14/20  2229   INR  1 00     Results from last 7 days   Lab Units 02/14/20  2229   LIPASE u/L 127       Imaging Studies: I have personally reviewed pertinent imaging studies  Ct Abdomen Pelvis With Contrast    Result Date: 2/15/2020  Impression: 1  Suggestion of mild gastritis in the distal and terminal and possible mild inflammation in the pylorus  Gastric or duodenal ulcer not excluded  2   Suggestion of sludge in the gallbladder without evidence of acute cholecystitis  Workstation performed: [id number]       ASSESSMENT and PLAN:      1) Upper abdominal pain and gastric inflammation seen on CT - As noted on imaging  She denies NSAID use  Hemoglobin is within normal limits  She had a slight leukocytosis on admission of over 12,000  Her symptoms may be secondary to gastroenteritis, PUD +/- H pylori, gastritis or biliary etiology    - Clear liquid diet as tolerated  - Avoid NSAIDs, GERD diet  - Continue Protonix twice daily  - Continue Carafate 4 times daily  - If symptoms do not improve, could consider endoscopy on Monday  Otherwise, this will occur as an outpatient  2) Gallbladder sludge - Normal LFTs  Follow-up right upper quadrant ultrasound  The patient was seen and examined by Dr Markus Cobos, all garay medical decisions were made with Dr Markus Cobos  Thank you for allowing us to participate in the care of this pleasant patient  We will follow up with you closely

## 2020-02-15 NOTE — ASSESSMENT & PLAN NOTE
· Since was a has had worsening pain in her epigastric area, especially after meals  Potentially contaminated food with spaghetti  No fevers or chills, she has been nauseous and vomiting, however no hematemesis  No changes in her stools  · CT: "Suggestion of mild gastritis in the distal and terminal and possible mild inflammation in the pylorus  Gastric or duodenal ulcer not excluded "  · Laboratory studies essentially unrevealing, she does have a mild leukocytosis which is likely secondary to vomiting  · IV PPI b i d  Michelle Garber · Start Carafate 4 times daily  · Clear liquid diet, advancing as tolerated  · Pain control with Mylanta, Pepcid as needed    · GI consultation

## 2020-02-15 NOTE — UTILIZATION REVIEW
Initial Clinical Review    Admission: Date/Time/Statement: Admission Orders (From admission, onward)     Ordered        02/15/20 0141  Place in Observation  Once                   Orders Placed This Encounter   Procedures    Place in Observation     Standing Status:   Standing     Number of Occurrences:   1     Order Specific Question:   Admitting Physician     Answer:   Karen Ellis     Order Specific Question:   Level of Care     Answer:   Med Surg [16]     ED Arrival Information     Expected Arrival Acuity Means of Arrival Escorted By Service Admission Type    - 2/14/2020 22:04 Urgent Walk-In Self General Medicine Urgent    Arrival Complaint    Abdominal Pain        Chief Complaint   Patient presents with    Abdominal Pain     Pt comes to ED c/o mid abdominal pain starting Wednesday  +nausea     Assessment/Plan: 37 y o  female who presents with epigastric pain, nausea, vomiting which has been worsening since Wednesday  Patient reports that time she ate some leftover spaghetti, which did not sit right with her  She has been increasingly nauseous, as well as had episodes of vomiting with severe epigastric pain since then  Reporting the pain gets worse with meals  However the pain can come at any time  No recent antibiotic use  She has had some relief with IV Dilaudid emergency department  Acute gastritis without hemorrhage  Assessment & Plan  · Since was a has had worsening pain in her epigastric area, especially after meals  Potentially contaminated food with spaghetti  No fevers or chills, she has been nauseous and vomiting, however no hematemesis  No changes in her stools  · CT: "Suggestion of mild gastritis in the distal and terminal and possible mild inflammation in the pylorus   Gastric or duodenal ulcer not excluded "  · Laboratory studies essentially unrevealing, she does have a mild leukocytosis which is likely secondary to vomiting  · IV PPI b i d  Constance Serum   · Start Carafate 4 times daily   · Clear liquid diet, advancing as tolerated  · Pain control with Mylanta, Pepcid as needed  · GI consultation  Class 2 obesity without serious comorbidity with body mass index (BMI) of 38 0 to 38 9 in adult  Assessment & Plan  · Body mass index is 38 19 kg/m²  · Diet exercise modifications stressed  Gallbladder sludge  Assessment & Plan  · CT: "Suggestion of sludge in the gallbladder without evidence of acute cholecystitis "  · No right upper quadrant symptoms  · Discussed with general surgery, they will see the patient in consultation  · Obtaining right upper quadrant ultrasound  No real evidence of acute cholecystitis clinically  · Likely outpatient follow-up as needed  * Bacteriuria  Assessment & Plan  · Patient noted to have contaminated urine, however it is not a clean-catch and she is not symptomatic  · Await urine culture, monitoring off of antibiotics  2/15 Gastroenterology:  ASSESSMENT and PLAN:    1) Upper abdominal pain and gastric inflammation seen on CT - As noted on imaging  She denies NSAID use  Hemoglobin is within normal limits  She had a slight leukocytosis on admission of over 12,000  Her symptoms may be secondary to gastroenteritis, PUD +/- H pylori, gastritis or biliary etiology  - Clear liquid diet as tolerated  - Avoid NSAIDs, GERD diet  - Continue Protonix twice daily  - Continue Carafate 4 times daily  - If symptoms do not improve, could consider endoscopy on Monday  Otherwise, this will occur as an outpatient  2) Gallbladder sludge - Normal LFTs  Follow-up right upper quadrant ultras    2/15 Surgery   34F with epigastric pain, gastroenteritis on CT, also noted biliary sludge  Stable  Plan:  PRN pain control  F/u GI plan  Possible ulcerative disease although denies NSAIDs, EtOH, smoking  On protonix, carafate  Does work as a teacher, unsure about sick contacts     Tolerating clears  IVF  DVT ppx  OOB   ED Triage Vitals   Temperature Pulse Respirations Blood Pressure SpO2   02/14/20 2216 02/14/20 2216 02/14/20 2216 02/14/20 2216 02/14/20 2216   98 °F (36 7 °C) 93 18 (!) 173/75 100 %      Temp Source Heart Rate Source Patient Position - Orthostatic VS BP Location FiO2 (%)   02/14/20 2216 02/14/20 2214 02/14/20 2216 02/14/20 2216 --   Oral Monitor Sitting Left arm       Pain Score       02/14/20 2216       8        Wt Readings from Last 1 Encounters:   02/14/20 111 kg (243 lb 13 3 oz)     Additional Vital Signs:   Date/Time  Temp  Pulse  Resp  BP  MAP (mmHg)  SpO2  O2 Device  Patient Position - Orthostatic VS   02/15/20 0700  98 8 °F (37 1 °C)  107Abnormal   18  142/77    95 %  None (Room air)  Lying   02/15/20 0251  98 9 °F (37 2 °C)  90  18  140/76    97 %  None (Room air)  Lying   02/15/20 0015    86  16  140/66  94  100 %  None (Room air)     02/14/20 2216  98 °F (36 7 °C)  93  18  173/75Abnormal     100 %  None (Room air)  Sitting      Weights (last 14 days)     Date/Time  Weight  Height   02/14/20 2216  111 kg (243 lb 13 3 oz)  5' 7" (1 702 m       Pertinent Labs/Diagnostic Test Results:   Results from last 7 days   Lab Units 02/15/20  0457 02/14/20  2228   WBC Thousand/uL 9 65 12 56*   HEMOGLOBIN g/dL 12 3 13 8   HEMATOCRIT % 37 1 40 3   PLATELETS Thousands/uL 192 242   NEUTROS ABS Thousands/µL 6 78 8 58*         Results from last 7 days   Lab Units 02/15/20  0457 02/14/20  2229   SODIUM mmol/L 138 140   POTASSIUM mmol/L 3 6 3 4*   CHLORIDE mmol/L 104 102   CO2 mmol/L 24 28   ANION GAP mmol/L 10 10   BUN mg/dL 8 9   CREATININE mg/dL 0 60 0 70   EGFR ml/min/1 73sq m 118 112   CALCIUM mg/dL 8 7 9 2     Results from last 7 days   Lab Units 02/15/20  0457 02/14/20  2229   AST U/L 20 23   ALT U/L 35 46   ALK PHOS U/L 106 117*   TOTAL PROTEIN g/dL 7 5 8 6*   ALBUMIN g/dL 3 5 4 1   TOTAL BILIRUBIN mg/dL 1 06* 1 09*         Results from last 7 days   Lab Units 02/15/20  0457 02/14/20  2229   GLUCOSE RANDOM mg/dL 93 88           Results from last 7 days   Lab Units 02/14/20  2229   PROTIME seconds 12 6   INR  1 00   PTT seconds 34             Results from last 7 days   Lab Units 02/14/20  2242   LACTIC ACID mmol/L 1 0                         Results from last 7 days   Lab Units 02/14/20  2229   LIPASE u/L 127             Results from last 7 days   Lab Units 02/14/20  2248   CLARITY UA  Cloudy   COLOR UA  Anne   SPEC GRAV UA  >=1 030   PH UA  5 5   GLUCOSE UA mg/dl Negative   KETONES UA mg/dl Negative   BLOOD UA  Small*   PROTEIN UA mg/dl Trace*   NITRITE UA  Negative   BILIRUBIN UA  Interference- unable to analyze*   UROBILINOGEN UA E U /dl 1 0   LEUKOCYTES UA  Small*   WBC UA /hpf 10-20*   RBC UA /hpf 0-1*   BACTERIA UA /hpf Moderate*   EPITHELIAL CELLS WET PREP /hpf Moderate*   MUCUS THREADS  Occasional*     Results from last 7 days   Lab Units 02/14/20 2249 02/14/20 2242   BLOOD CULTURE  Received in Microbiology Lab  Culture in Progress  Received in Microbiology Lab  Culture in Progress  2/15 CT abd pelvis wo contrast:  1   Suggestion of mild gastritis in the distal and terminal and possible mild inflammation in the pylorus   Gastric or duodenal ulcer not excluded    2   Suggestion of sludge in the gallbladder without evidence of acute cholecystitis  2/15 US right upper quadrant      ED Treatment:   Medication Administration from 02/14/2020 2204 to 02/15/2020 0236       Date/Time Order Dose Route Action     02/14/2020 2306 sodium chloride 0 9 % bolus 1,000 mL 1,000 mL Intravenous New Bag     02/15/2020 0030 sodium chloride 0 9 % infusion 125 mL/hr Intravenous New Bag     02/14/2020 2306 ondansetron (ZOFRAN) injection 4 mg 4 mg Intravenous Given     02/14/2020 2307 HYDROmorphone (DILAUDID) injection 0 5 mg 0 5 mg Intravenous Given     02/15/2020 0137 HYDROmorphone (DILAUDID) injection 0 5 mg 0 5 mg Intravenous Given     02/15/2020 0137 pantoprazole (PROTONIX) injection 40 mg 40 mg Intravenous Given        Past Medical History:   Diagnosis Date    Anxiety     Depression     PTSD (post-traumatic stress disorder)      Present on Admission:   Acute gastritis without hemorrhage   Gallbladder sludge   Bacteriuria    Admitting Diagnosis: Gastritis [K29 70]  Sludge in gallbladder [K82 8]  Pain of upper abdomen [R10 10]  Unspecified abdominal pain [R10 9]  Age/Sex: 37 y o  female  Admission Orders:  IP CONSULT TO ACUTE CARE SURGERY  IP CONSULT TO GASTROENTEROLOGY  US  Right upper quadrant  Activity as tolerated  Clear liquid diet    Scheduled Medications:    Medications:  pantoprazole 40 mg Intravenous Q12H [facility]   sucralfate 1,000 mg Oral 4x Daily (AC & HS)     Continuous IV Infusions:    sodium chloride 125 mL/hr Intravenous Continuous     PRN Meds:    acetaminophen 650 mg Oral Q6H PRN   aluminum-magnesium hydroxide-simethicone 30 mL Oral Q4H PRN   ondansetron 4 mg Intravenous Q6H PRN     Network Utilization Review Department  [email or web address]  ATTENTION: Please call with any questions or concerns to [phone] and carefully listen to the prompts so that you are directed to the right person  All voicemails are confidential   Corby Quick all requests for admission clinical reviews, approved or denied determinations and any other requests to dedicated fax number below belonging to the campus where the patient is receiving treatment   List of dedicated fax numbers for the Facilities:  FACILITY NAME UR FAX NUMBER   ADMISSION DENIALS (Administrative/Medical Necessity) [phone]   [address] (Maternity/NICU/Pediatrics) [phone]   [facility] [phone]   Carley Baumann [phone]   Aden Marin [phone]   [address] [phone]   [facility] [address] [facility] [address]  [place] [phone]

## 2020-02-15 NOTE — CONSULTS
Consultation - General Surgery   Boris Merino 37 y o  female MRN: [id number]  Unit/Bed#: S -01 Encounter: [id number]    Assessment/Plan   34F with epigastric pain, gastroenteritis on CT, also noted biliary sludge  Stable    Plan:  PRN pain control  F/u GI plan  Possible ulcerative disease although denies NSAIDs, EtOH, smoking  On protonix, carafate  Does work as a teacher, unsure about sick contacts  Tolerating clears  IVF  DVT ppx  OOB     History of Present Illness   HPI:  Boris Merino is a 37 y o  female who presents with epigastric pain and nausea  Never had similar pain to this before  Stated she had eaten some old spaghetti night before pain started  Pain is sharp and constant in epigastrium, radiates to RUQ  CT showed gastroenteritis in stomach and duodenum, possible ulcerative diseases  Also noted sludge in GB and US showed stones in GB, 1 4mm wall and 3mm duct       Consults    Review of Systems:  No fevers, no chills  No headache, no dizziness  No sore throat, no nasal discharge  No chest pain, no palpitations  No SOB, no cough  Abd as above  No joint pain, no weakness  No pruritis, no rash  No depression, no anxiety    Historical Information   Past Medical History:   Diagnosis Date    Anxiety     Depression     PTSD (post-traumatic stress disorder)      Past Surgical History:   Procedure Laterality Date    KNEE ARTHROSCOPY W/ ACL RECONSTRUCTION       Social History   Social History     Substance and Sexual Activity   Alcohol Use No     Social History     Substance and Sexual Activity   Drug Use No     Social History     Tobacco Use   Smoking Status Never Smoker   Smokeless Tobacco Never Used     Family History:   Family History   Problem Relation Age of Onset    Hypertension Mother     COPD Father        Meds/Allergies   all current active meds have been reviewed  No Known Allergies    Objective   First Vitals:   Blood Pressure: (!) 173/75 (02/14/20 2216)  Pulse: 93 (02/14/20 2216)  Temperature: 98 °F (36 7 °C) (02/14/20 2216)  Temp Source: Oral (02/14/20 2216)  Respirations: 18 (02/14/20 2216)  Height: 5' 7" (170 2 cm) (02/14/20 2216)  Weight - Scale: 111 kg (243 lb 13 3 oz) (02/14/20 2216)  SpO2: 100 % (02/14/20 2216)    Current Vitals:   Blood Pressure: 142/77 (02/15/20 0700)  Pulse: (!) 107 (02/15/20 0700)  Temperature: 98 8 °F (37 1 °C) (02/15/20 0700)  Temp Source: Oral (02/15/20 0700)  Respirations: 18 (02/15/20 0700)  Height: 5' 7" (170 2 cm) (02/14/20 2216)  Weight - Scale: 111 kg (243 lb 13 3 oz) (02/14/20 2216)  SpO2: 95 % (02/15/20 0700)      Intake/Output Summary (Last 24 hours) at 2/15/2020 1243  Last data filed at 2/15/2020 0010  Gross per 24 hour   Intake 1000 ml   Output    Net 1000 ml       Invasive Devices     Peripheral Intravenous Line            Peripheral IV 02/14/20 Right Antecubital less than 1 day                Physical Exam:  NAD, Aox3  MMM, NCAT  EOMI, PERRLA  Regular rate and rhythm  Equal chest expansion, unlabored respirations  Abd soft, NTND  Ext: No deficits, warm and well perfused  Normal mood and affect  Lab Results: I have personally reviewed pertinent lab results  Imaging: I have personally reviewed pertinent reports  and I have personally reviewed pertinent films in PACS  EKG, Pathology, and Other Studies: I have personally reviewed pertinent reports        Recent Results (from the past 36 hour(s))   CBC and differential    Collection Time: 02/14/20 10:28 PM   Result Value Ref Range    WBC 12 56 (H) 4 31 - 10 16 Thousand/uL    RBC 4 44 3 81 - 5 12 Million/uL    Hemoglobin 13 8 11 5 - 15 4 g/dL    Hematocrit 40 3 34 8 - 46 1 %    MCV 91 82 - 98 fL    MCH 31 1 26 8 - 34 3 pg    MCHC 34 2 31 4 - 37 4 g/dL    RDW 12 8 11 6 - 15 1 %    MPV 9 9 8 9 - 12 7 fL    Platelets 491 835 - 607 Thousands/uL    nRBC 0 /100 WBCs    Neutrophils Relative 69 43 - 75 %    Immat GRANS % 0 0 - 2 %    Lymphocytes Relative 22 14 - 44 %    Monocytes Relative 7 4 - 12 %    Eosinophils Relative 2 0 - 6 %    Basophils Relative 0 0 - 1 %    Neutrophils Absolute 8 58 (H) 1 85 - 7 62 Thousands/µL    Immature Grans Absolute 0 04 0 00 - 0 20 Thousand/uL    Lymphocytes Absolute 2 77 0 60 - 4 47 Thousands/µL    Monocytes Absolute 0 92 0 17 - 1 22 Thousand/µL    Eosinophils Absolute 0 21 0 00 - 0 61 Thousand/µL    Basophils Absolute 0 04 0 00 - 0 10 Thousands/µL   Protime-INR    Collection Time: 02/14/20 10:29 PM   Result Value Ref Range    Protime 12 6 11 6 - 14 5 seconds    INR 1 00 0 84 - 1 19   APTT    Collection Time: 02/14/20 10:29 PM   Result Value Ref Range    PTT 34 23 - 37 seconds   Comprehensive metabolic panel    Collection Time: 02/14/20 10:29 PM   Result Value Ref Range    Sodium 140 136 - 145 mmol/L    Potassium 3 4 (L) 3 5 - 5 3 mmol/L    Chloride 102 100 - 108 mmol/L    CO2 28 21 - 32 mmol/L    ANION GAP 10 4 - 13 mmol/L    BUN 9 5 - 25 mg/dL    Creatinine 0 70 0 60 - 1 30 mg/dL    Glucose 88 65 - 140 mg/dL    Calcium 9 2 8 3 - 10 1 mg/dL    AST 23 5 - 45 U/L    ALT 46 12 - 78 U/L    Alkaline Phosphatase 117 (H) 46 - 116 U/L    Total Protein 8 6 (H) 6 4 - 8 2 g/dL    Albumin 4 1 3 5 - 5 0 g/dL    Total Bilirubin 1 09 (H) 0 20 - 1 00 mg/dL    eGFR 112 ml/min/1 73sq m   Lipase    Collection Time: 02/14/20 10:29 PM   Result Value Ref Range    Lipase 127 73 - 393 u/L   hCG, qualitative pregnancy    Collection Time: 02/14/20 10:29 PM   Result Value Ref Range    Preg, Serum Negative Negative   Blood culture #1    Collection Time: 02/14/20 10:42 PM   Result Value Ref Range    Blood Culture Received in Microbiology Lab  Culture in Progress      Lactic acid, plasma x2    Collection Time: 02/14/20 10:42 PM   Result Value Ref Range    LACTIC ACID 1 0 0 5 - 2 0 mmol/L   POCT pregnancy, urine    Collection Time: 02/14/20 10:46 PM   Result Value Ref Range    EXT PREG TEST UR (Ref: Negative) Negative     Control Valid    Urine Macroscopic, POC    Collection Time: 02/14/20 10:48 PM Result Value Ref Range    Color, UA Anne     Clarity, UA Cloudy     pH, UA 5 5 4 5 - 8 0    Leukocytes, UA Small (A) Negative    Nitrite, UA Negative Negative    Protein, UA Trace (A) Negative mg/dl    Glucose, UA Negative Negative mg/dl    Ketones, UA Negative Negative mg/dl    Urobilinogen, UA 1 0 0 2, 1 0 E U /dl E U /dl    Bilirubin, UA Interference- unable to analyze (A) Negative    Blood, UA Small (A) Negative    Specific Gravity, UA >=1 030 1 003 - 1 030   Urine Microscopic    Collection Time: 02/14/20 10:48 PM   Result Value Ref Range    RBC, UA 0-1 (A) None Seen, 0-5 /hpf    WBC, UA 10-20 (A) None Seen, 0-5, 5-55, 5-65 /hpf    Epithelial Cells Moderate (A) None Seen, Occasional /hpf    Bacteria, UA Moderate (A) None Seen, Occasional /hpf    MUCUS THREADS Occasional (A) None Seen   Blood culture #2    Collection Time: 02/14/20 10:49 PM   Result Value Ref Range    Blood Culture Received in Microbiology Lab  Culture in Progress      Comprehensive metabolic panel    Collection Time: 02/15/20  4:57 AM   Result Value Ref Range    Sodium 138 136 - 145 mmol/L    Potassium 3 6 3 5 - 5 3 mmol/L    Chloride 104 100 - 108 mmol/L    CO2 24 21 - 32 mmol/L    ANION GAP 10 4 - 13 mmol/L    BUN 8 5 - 25 mg/dL    Creatinine 0 60 0 60 - 1 30 mg/dL    Glucose 93 65 - 140 mg/dL    Calcium 8 7 8 3 - 10 1 mg/dL    AST 20 5 - 45 U/L    ALT 35 12 - 78 U/L    Alkaline Phosphatase 106 46 - 116 U/L    Total Protein 7 5 6 4 - 8 2 g/dL    Albumin 3 5 3 5 - 5 0 g/dL    Total Bilirubin 1 06 (H) 0 20 - 1 00 mg/dL    eGFR 118 ml/min/1 73sq m   CBC and differential    Collection Time: 02/15/20  4:57 AM   Result Value Ref Range    WBC 9 65 4 31 - 10 16 Thousand/uL    RBC 4 02 3 81 - 5 12 Million/uL    Hemoglobin 12 3 11 5 - 15 4 g/dL    Hematocrit 37 1 34 8 - 46 1 %    MCV 92 82 - 98 fL    MCH 30 6 26 8 - 34 3 pg    MCHC 33 2 31 4 - 37 4 g/dL    RDW 12 8 11 6 - 15 1 %    MPV 9 9 8 9 - 12 7 fL    Platelets 760 392 - 632 Thousands/uL    nRBC 0 /100 WBCs    Neutrophils Relative 71 43 - 75 %    Immat GRANS % 0 0 - 2 %    Lymphocytes Relative 19 14 - 44 %    Monocytes Relative 8 4 - 12 %    Eosinophils Relative 2 0 - 6 %    Basophils Relative 0 0 - 1 %    Neutrophils Absolute 6 78 1 85 - 7 62 Thousands/µL    Immature Grans Absolute 0 02 0 00 - 0 20 Thousand/uL    Lymphocytes Absolute 1 87 0 60 - 4 47 Thousands/µL    Monocytes Absolute 0 76 0 17 - 1 22 Thousand/µL    Eosinophils Absolute 0 18 0 00 - 0 61 Thousand/µL    Basophils Absolute 0 04 0 00 - 0 10 Thousands/µL       Counseling / Coordination of Care  Total floor / unit time spent today 45 minutes  Greater than 50% of total time was spent with the patient and / or family counseling and / or coordination of care  A description of the counseling / coordination of care: 39

## 2020-02-15 NOTE — PLAN OF CARE
Problem: PAIN - ADULT  Goal: Verbalizes/displays adequate comfort level or baseline comfort level  Description  Interventions:  - Encourage patient to monitor pain and request assistance  - Assess pain using appropriate pain scale  - Administer analgesics based on type and severity of pain and evaluate response  - Implement non-pharmacological measures as appropriate and evaluate response  - Consider cultural and social influences on pain and pain management  - Notify physician/advanced practitioner if interventions unsuccessful or patient reports new pain  Outcome: Progressing     Problem: DISCHARGE PLANNING  Goal: Discharge to home or other facility with appropriate resources  Description  INTERVENTIONS:  - Identify barriers to discharge w/patient and caregiver  - Arrange for needed discharge resources and transportation as appropriate  - Identify discharge learning needs (meds, wound care, etc )  - Arrange for interpretive services to assist at discharge as needed  - Refer to Case Management Department for coordinating discharge planning if the patient needs post-hospital services based on physician/advanced practitioner order or complex needs related to functional status, cognitive ability, or social support system  Outcome: Progressing     Problem: Knowledge Deficit  Goal: Patient/family/caregiver demonstrates understanding of disease process, treatment plan, medications, and discharge instructions  Description  Complete learning assessment and assess knowledge base    Interventions:  - Provide teaching at level of understanding  - Provide teaching via preferred learning methods  Outcome: Progressing     Problem: GASTROINTESTINAL - ADULT  Goal: Minimal or absence of nausea and/or vomiting  Description  INTERVENTIONS:  - Administer IV fluids if ordered to ensure adequate hydration  - Maintain NPO status until nausea and vomiting are resolved  - Nasogastric tube if ordered  - Administer ordered antiemetic medications as needed  - Provide nonpharmacologic comfort measures as appropriate  - Advance diet as tolerated, if ordered  - Consider nutrition services referral to assist patient with adequate nutrition and appropriate food choices  Outcome: Not Progressing  Goal: Maintains or returns to baseline bowel function  Description  INTERVENTIONS:  - Assess bowel function  - Encourage oral fluids to ensure adequate hydration  - Administer IV fluids if ordered to ensure adequate hydration  - Administer ordered medications as needed  - Encourage mobilization and activity  - Consider nutritional services referral to assist patient with adequate nutrition and appropriate food choices  Outcome: Progressing  Goal: Maintains adequate nutritional intake  Description  INTERVENTIONS:  - Monitor percentage of each meal consumed  - Identify factors contributing to decreased intake, treat as appropriate  - Assist with meals as needed  - Monitor I&O, weight, and lab values if indicated  - Obtain nutrition services referral as needed  Outcome: Progressing     Problem: Prexisting or High Potential for Compromised Skin Integrity  Goal: Skin integrity is maintained or improved  Description  INTERVENTIONS:  - Identify patients at risk for skin breakdown  - Assess and monitor skin integrity  - Assess and monitor nutrition and hydration status  - Monitor labs   - Assess for incontinence   - Turn and reposition patient  - Assist with mobility/ambulation  - Relieve pressure over bony prominences  - Avoid friction and shearing  - Provide appropriate hygiene as needed including keeping skin clean and dry  - Evaluate need for skin moisturizer/barrier cream  - Collaborate with interdisciplinary team   - Patient/family teaching  - Consider wound care consult   Outcome: Progressing

## 2020-02-15 NOTE — ASSESSMENT & PLAN NOTE
· Patient noted to have contaminated urine, however it is not a clean-catch and she is not symptomatic  · Await urine culture, monitoring off of antibiotics

## 2020-02-16 ENCOUNTER — TELEPHONE (OUTPATIENT)
Dept: GASTROENTEROLOGY | Facility: AMBULARY SURGERY CENTER | Age: 35
End: 2020-02-16

## 2020-02-16 VITALS
BODY MASS INDEX: 38.27 KG/M2 | RESPIRATION RATE: 18 BRPM | WEIGHT: 243.83 LBS | OXYGEN SATURATION: 97 % | HEART RATE: 87 BPM | HEIGHT: 67 IN | DIASTOLIC BLOOD PRESSURE: 77 MMHG | TEMPERATURE: 99.4 F | SYSTOLIC BLOOD PRESSURE: 130 MMHG

## 2020-02-16 LAB
BACTERIA UR CULT: ABNORMAL
BACTERIA UR CULT: ABNORMAL

## 2020-02-16 PROCEDURE — 99217 PR OBSERVATION CARE DISCHARGE MANAGEMENT: CPT | Performed by: INTERNAL MEDICINE

## 2020-02-16 PROCEDURE — C9113 INJ PANTOPRAZOLE SODIUM, VIA: HCPCS | Performed by: PHYSICIAN ASSISTANT

## 2020-02-16 RX ADMIN — SUCRALFATE 1000 MG: 1 SUSPENSION ORAL at 06:18

## 2020-02-16 RX ADMIN — SUCRALFATE 1000 MG: 1 SUSPENSION ORAL at 11:25

## 2020-02-16 RX ADMIN — SODIUM CHLORIDE 125 ML/HR: 0.9 INJECTION, SOLUTION INTRAVENOUS at 07:17

## 2020-02-16 RX ADMIN — PANTOPRAZOLE SODIUM 40 MG: 40 INJECTION, POWDER, FOR SOLUTION INTRAVENOUS at 08:32

## 2020-02-16 NOTE — PLAN OF CARE
Problem: PAIN - ADULT  Goal: Verbalizes/displays adequate comfort level or baseline comfort level  Description  Interventions:  - Encourage patient to monitor pain and request assistance  - Assess pain using appropriate pain scale  - Administer analgesics based on type and severity of pain and evaluate response  - Implement non-pharmacological measures as appropriate and evaluate response  - Consider cultural and social influences on pain and pain management  - Notify physician/advanced practitioner if interventions unsuccessful or patient reports new pain  Outcome: Progressing     Problem: DISCHARGE PLANNING  Goal: Discharge to home or other facility with appropriate resources  Description  INTERVENTIONS:  - Identify barriers to discharge w/patient and caregiver  - Arrange for needed discharge resources and transportation as appropriate  - Identify discharge learning needs (meds, wound care, etc )  - Arrange for interpretive services to assist at discharge as needed  - Refer to Case Management Department for coordinating discharge planning if the patient needs post-hospital services based on physician/advanced practitioner order or complex needs related to functional status, cognitive ability, or social support system  Outcome: Progressing     Problem: Knowledge Deficit  Goal: Patient/family/caregiver demonstrates understanding of disease process, treatment plan, medications, and discharge instructions  Description  Complete learning assessment and assess knowledge base    Interventions:  - Provide teaching at level of understanding  - Provide teaching via preferred learning methods  Outcome: Progressing     Problem: GASTROINTESTINAL - ADULT  Goal: Minimal or absence of nausea and/or vomiting  Description  INTERVENTIONS:  - Administer IV fluids if ordered to ensure adequate hydration  - Maintain NPO status until nausea and vomiting are resolved  - Nasogastric tube if ordered  - Administer ordered antiemetic medications as needed  - Provide nonpharmacologic comfort measures as appropriate  - Advance diet as tolerated, if ordered  - Consider nutrition services referral to assist patient with adequate nutrition and appropriate food choices  Outcome: Progressing  Goal: Maintains or returns to baseline bowel function  Description  INTERVENTIONS:  - Assess bowel function  - Encourage oral fluids to ensure adequate hydration  - Administer IV fluids if ordered to ensure adequate hydration  - Administer ordered medications as needed  - Encourage mobilization and activity  - Consider nutritional services referral to assist patient with adequate nutrition and appropriate food choices  Outcome: Progressing  Goal: Maintains adequate nutritional intake  Description  INTERVENTIONS:  - Monitor percentage of each meal consumed  - Identify factors contributing to decreased intake, treat as appropriate  - Assist with meals as needed  - Monitor I&O, weight, and lab values if indicated  - Obtain nutrition services referral as needed  Outcome: Progressing     Problem: Prexisting or High Potential for Compromised Skin Integrity  Goal: Skin integrity is maintained or improved  Description  INTERVENTIONS:  - Identify patients at risk for skin breakdown  - Assess and monitor skin integrity  - Assess and monitor nutrition and hydration status  - Monitor labs   - Assess for incontinence   - Turn and reposition patient  - Assist with mobility/ambulation  - Relieve pressure over bony prominences  - Avoid friction and shearing  - Provide appropriate hygiene as needed including keeping skin clean and dry  - Evaluate need for skin moisturizer/barrier cream  - Collaborate with interdisciplinary team   - Patient/family teaching  - Consider wound care consult   Outcome: Progressing

## 2020-02-16 NOTE — DISCHARGE SUMMARY
Discharge- Dinesh Vyas 1985, 37 y o  female MRN: [id number]    Unit/Bed#: S -68 Encounter: [id number]    Primary Care Provider: Bassam Lee MD   Date and time admitted to hospital: 2/14/2020 10:10 PM        * Acute gastritis without hemorrhage  Assessment & Plan  · Since was a has had worsening pain in her epigastric area, especially after meals  Potentially contaminated food with spaghetti  No fevers or chills, she has been nauseous and vomiting, however no hematemesis  No changes in her stools  · CT: "Suggestion of mild gastritis in the distal and terminal and possible mild inflammation in the pylorus  Gastric or duodenal ulcer not excluded "  · Laboratory studies essentially unrevealing, she does have a mild leukocytosis which is likely secondary to vomiting  · IV PPI b i d  · Start Carafate 4 times daily  · Advance diet, tolerated well this a m  · Pain control with Mylanta, Pepcid as needed  · Per GI patient is stable, and may pursue EGD on outpatient basis  · Per general surgery, no role for surgical intervention of her gallstones, follow-up outpatient     Bacteriuria  Assessment & Plan  · Patient noted to have contaminated urine, however it is not a clean-catch and she is not symptomatic  · Await urine culture, monitoring off of antibiotics  Class 2 obesity without serious comorbidity with body mass index (BMI) of 38 0 to 38 9 in adult  Assessment & Plan  · Body mass index is 38 19 kg/m²  · Diet exercise modifications stressed  Gallbladder sludge  Assessment & Plan  · CT: "Suggestion of sludge in the gallbladder without evidence of acute cholecystitis "  · No right upper quadrant symptoms  · Obtaining right upper quadrant ultrasound  No real evidence of acute cholecystitis clinically    · No surgical intervention at this time,will follow-up with     Discharging Resident Physician: Hannah Moss MD  Attending: No att  providers found  PCP: Bassam Lee MD  Admission Date: 2/14/2020  Discharge Date: 02/16/20    Disposition:     Home    Reason for Admission:  Acute gastritis    Consultations During Hospital Stay:  · General surgery and Gastroenterology    Procedures Performed:     · None    Significant Findings / Test Results:     · CT abdomen pelvis with contrast- 1) Suggestion of mild gastritis in the distal and terminal and possible mild inflammation in the pylorus   Gastric or duodenal ulcer not excluded  2) Suggestion of sludge in the gallbladder without evidence of acute cholecystitis  Incidental Findings:   · None    Test Results Pending at Discharge (will require follow up): · Ultrasound right upper quadrant-final result pending     Outpatient Tests Requested:  · Per GI, EGD outpatient    Complications:  None    Hospital Course:     Mariano Deras is a 37 y o  female patient with past medical history of anxiety and depression who originally presented to the hospital on 2/14/2020 due to worsening upper abdominal pain exacerbated by eating, along with N/V and loose BM since 2/12/20  Upon arrival to ED, patient denied fevers, chills, hematemesis or rectal bleeding  In ED, Patient was started on protonix, dilaudid, and zofran  Imaging -CT showing inflammation in the distal gastri antrum and pylorus, as well as, sludge in the gallbladder without evidence of acute cholecystitis  Patient admitted and supportive care/liquid diet/Protonix/Carafate was continued  GI consulted for further management recommended EGD at this time to rule out PUD  General surgery consulted, requested US RUQ unofficial read noting gallstones without acute cholecystitis  Symptoms of nausea/vomiting and abdominal pain resolved at this time  Gastroenterology and general surgery noting patient is stable for discharge at this time  Patient to follow-up with GI for EGD and General surgery for cholelithiasis on outpatient basis      Condition at Discharge: good     Discharge Day Visit / Exam:     Subjective:  Patient denies abdominal pain, nausea, vomiting overnight or this morning  Patient states she was able to tolerate breakfast without issue  Vitals: Blood Pressure: 130/77 (02/16/20 0727)  Pulse: 87 (02/16/20 0727)  Temperature: 99 4 °F (37 4 °C) (02/16/20 0727)  Temp Source: Oral (02/16/20 0727)  Respirations: 18 (02/16/20 0727)  Height: 5' 7" (170 2 cm) (02/14/20 2216)  Weight - Scale: 111 kg (243 lb 13 3 oz) (02/14/20 2216)  SpO2: 97 % (02/16/20 0727)  Exam:   Physical Exam   Constitutional: She is oriented to person, place, and time  She appears well-developed and well-nourished  HENT:   Head: Normocephalic and atraumatic  Nose: Nose normal    Eyes: Conjunctivae are normal    Neck: Normal range of motion  Neck supple  Cardiovascular: Normal rate, regular rhythm and normal heart sounds  Pulmonary/Chest: Effort normal and breath sounds normal    Abdominal: Soft  Bowel sounds are normal  She exhibits no distension  There is no tenderness  There is no guarding  Musculoskeletal: Normal range of motion  Neurological: She is alert and oriented to person, place, and time  Skin: Skin is warm and dry  Psychiatric: She has a normal mood and affect  Vitals reviewed  Discussion with Family:  Discussed plan of care for follow-up with general surgery and Gastroenterology outpatient  Patient agrees and will follow-up accordingly  Discharge instructions/Information to patient and family:   See after visit summary for information provided to patient and family  Provisions for Follow-Up Care:  See after visit summary for information related to follow-up care and any pertinent home health orders  Planned Readmission:  None     Discharge Medications:  See after visit summary for reconciled discharge medications provided to patient and family        ** Please Note: This note has been constructed using a voice recognition system **

## 2020-02-16 NOTE — PROGRESS NOTES
Progress Note - General Surgery   Cam Lemus 37 y o  female MRN: [id number]  Unit/Bed#: S -01 Encounter: [id number]        Subjective/Objective     Subjective:  Feeling much better overnight  Pain much improved  Blood pressure 130/77, pulse 87, temperature 99 4 °F (37 4 °C), temperature source Oral, resp  rate 18, height 5' 7" (1 702 m), weight 111 kg (243 lb 13 3 oz), SpO2 97 %  ,Body mass index is 38 19 kg/m²  Intake/Output Summary (Last 24 hours) at 2/16/2020 1030  Last data filed at 2/15/2020 2315  Gross per 24 hour   Intake 1050 ml   Output    Net 1050 ml           Physical Exam:   Soft nontender          Assessment:  Pain likely gastritis versus viral syndrome  Incidental gallstones noted    Plan:  Clinically she is dramatically improved on a PPI and Carafate  No role for surgical intervention of her gallstones at this time  She can certainly follow up in the office to further discuss this  Will see as needed  Okay for discharge from a surgical standpoint      [employer] [facility] [employer], DO  2/16/2020  10:30 AM

## 2020-02-16 NOTE — DISCHARGE INSTR - AVS FIRST PAGE
Dear Miriam Elias,     It was our pleasure to care for you here at [facility], [address]  It is our hope that we were always able to exceed the expected standards for your care during your stay  You were hospitalized due to acute gastritis  You were cared for on the self 4th floor by Yoshi Cho MD under the service of Xena Lorenzo MD with the [facility] who covers for your primary care physician (PCP), Luiza Kenyon MD, while you were hospitalized  If you have any questions or concerns related to this hospitalization, you may contact us at [phone]  For follow up as well as any medication refills, we recommend that you follow up with your primary care physician  A registered nurse will reach out to you by phone within a few days after your discharge to answer any additional questions that you may have after going home  However, at this time we provide for you here, the most important instructions / recommendations at discharge:     · Notable Medication Adjustments -   · None  · Testing Required after Discharge -   · EGD  · Important follow up information -   · Follow-up with GI outpatient and General Surgery on outpatient basis  · Other Instructions -   · None  · Please review this entire after visit summary as additional general instructions including medication list, appointments, activity, diet, any pertinent wound care, and other additional recommendations from your care team that may be provided for you        Sincerely,     Yoshi Cho MD Detail Level: Simple

## 2020-02-16 NOTE — TELEPHONE ENCOUNTER
Please schedule EGD within 4 weeks with any of her providers  Thank you  Diagnosis is epigastric pain, nausea and vomiting and GERD

## 2020-02-16 NOTE — ASSESSMENT & PLAN NOTE
· Since was a has had worsening pain in her epigastric area, especially after meals  Potentially contaminated food with spaghetti  No fevers or chills, she has been nauseous and vomiting, however no hematemesis  No changes in her stools  · CT: "Suggestion of mild gastritis in the distal and terminal and possible mild inflammation in the pylorus  Gastric or duodenal ulcer not excluded "  · Laboratory studies essentially unrevealing, she does have a mild leukocytosis which is likely secondary to vomiting  · IV PPI b i d  · Start Carafate 4 times daily  · Advance diet, tolerated well this a m  · Pain control with Mylanta, Pepcid as needed     · Per GI patient is stable, and may pursue EGD on outpatient basis  · Per general surgery, no role for surgical intervention of her gallstones, follow-up outpatient

## 2020-02-16 NOTE — ASSESSMENT & PLAN NOTE
· CT: "Suggestion of sludge in the gallbladder without evidence of acute cholecystitis "  · No right upper quadrant symptoms  · Obtaining right upper quadrant ultrasound  No real evidence of acute cholecystitis clinically    · No surgical intervention at this time,will follow-up with

## 2020-02-18 ENCOUNTER — TELEPHONE (OUTPATIENT)
Dept: GASTROENTEROLOGY | Facility: AMBULARY SURGERY CENTER | Age: 35
End: 2020-02-18

## 2020-02-20 LAB
BACTERIA BLD CULT: NORMAL
BACTERIA BLD CULT: NORMAL

## 2021-03-23 ENCOUNTER — TELEPHONE (OUTPATIENT)
Dept: PSYCHIATRY | Facility: CLINIC | Age: 36
End: 2021-03-23

## 2022-01-21 ENCOUNTER — NURSE TRIAGE (OUTPATIENT)
Dept: OTHER | Facility: OTHER | Age: 37
End: 2022-01-21

## 2022-01-21 DIAGNOSIS — Z20.828 SARS-ASSOCIATED CORONAVIRUS EXPOSURE: Primary | ICD-10-CM

## 2022-01-21 PROCEDURE — U0005 INFEC AGEN DETEC AMPLI PROBE: HCPCS | Performed by: FAMILY MEDICINE

## 2022-01-21 PROCEDURE — U0003 INFECTIOUS AGENT DETECTION BY NUCLEIC ACID (DNA OR RNA); SEVERE ACUTE RESPIRATORY SYNDROME CORONAVIRUS 2 (SARS-COV-2) (CORONAVIRUS DISEASE [COVID-19]), AMPLIFIED PROBE TECHNIQUE, MAKING USE OF HIGH THROUGHPUT TECHNOLOGIES AS DESCRIBED BY CMS-2020-01-R: HCPCS | Performed by: FAMILY MEDICINE

## 2022-01-21 NOTE — TELEPHONE ENCOUNTER
Reason for Disposition   [1] COVID-19 infection suspected by caller or triager AND [2] mild symptoms (cough, fever, or others) AND [3] has not gotten tested yet    Answer Assessment - Initial Assessment Questions  Were you within 6 feet or less, for up to 15 minutes or more with a person that has a confirmed COVID-19 test?   Denies    What was the date of your exposure? N/A    Are you experiencing any symptoms attributed to the virus?  (Assess for SOB, cough, fever, difficulty breathing)   Yes, loss of smell and taste, headache    HIGH RISK: Do you have any history heart or lung conditions, weakened immune system, diabetes, Asthma, CHF, HIV, COPD, Chemo, renal failure, sickle cell, etc?   Pneumonia    PREGNANCY: Are you pregnant or did you recently give birth?   Denies    VACCINE: "Have you gotten the COVID-19 vaccine?" If Yes ask: "Which one, how many shots, when did you get it?"   Yes, J&J and booster    Protocols used: CORONAVIRUS (COVID-19) DIAGNOSED OR SUSPECTED-ADULT-AH

## 2022-01-21 NOTE — TELEPHONE ENCOUNTER
Regarding: Covid/slpg loss of sense and taste  ----- Message from Nelia Delgado sent at 1/21/2022  6:47 PM EST -----  "I am losing my sense smell and taste and headache"